# Patient Record
Sex: FEMALE | Race: WHITE | NOT HISPANIC OR LATINO
[De-identification: names, ages, dates, MRNs, and addresses within clinical notes are randomized per-mention and may not be internally consistent; named-entity substitution may affect disease eponyms.]

---

## 2020-11-25 PROBLEM — Z00.00 ENCOUNTER FOR PREVENTIVE HEALTH EXAMINATION: Status: ACTIVE | Noted: 2020-11-25

## 2021-01-28 ENCOUNTER — APPOINTMENT (OUTPATIENT)
Dept: ELECTROPHYSIOLOGY | Facility: CLINIC | Age: 76
End: 2021-01-28

## 2023-10-09 ENCOUNTER — INPATIENT (INPATIENT)
Facility: HOSPITAL | Age: 78
LOS: 2 days | Discharge: NURSING, REHAB OR LONG-TERM CARE | DRG: 206 | End: 2023-10-12
Attending: HOSPITALIST | Admitting: HOSPITALIST
Payer: MEDICARE

## 2023-10-09 VITALS
OXYGEN SATURATION: 96 % | WEIGHT: 199.96 LBS | TEMPERATURE: 98 F | RESPIRATION RATE: 18 BRPM | SYSTOLIC BLOOD PRESSURE: 127 MMHG | HEART RATE: 70 BPM | DIASTOLIC BLOOD PRESSURE: 75 MMHG | HEIGHT: 66 IN

## 2023-10-09 PROCEDURE — 99285 EMERGENCY DEPT VISIT HI MDM: CPT | Mod: GC

## 2023-10-10 DIAGNOSIS — J18.9 PNEUMONIA, UNSPECIFIED ORGANISM: ICD-10-CM

## 2023-10-10 LAB
ALBUMIN SERPL ELPH-MCNC: 4 G/DL — SIGNIFICANT CHANGE UP (ref 3.3–5)
ALP SERPL-CCNC: 89 U/L — SIGNIFICANT CHANGE UP (ref 40–120)
ALT FLD-CCNC: 26 U/L — SIGNIFICANT CHANGE UP (ref 10–45)
ANION GAP SERPL CALC-SCNC: 20 MMOL/L — HIGH (ref 5–17)
APPEARANCE UR: ABNORMAL
APTT BLD: 26.7 SEC — SIGNIFICANT CHANGE UP (ref 24.5–35.6)
AST SERPL-CCNC: 26 U/L — SIGNIFICANT CHANGE UP (ref 10–40)
BACTERIA # UR AUTO: NEGATIVE — SIGNIFICANT CHANGE UP
BASE EXCESS BLDV CALC-SCNC: 9.4 MMOL/L — HIGH (ref -2–3)
BASOPHILS # BLD AUTO: 0.02 K/UL — SIGNIFICANT CHANGE UP (ref 0–0.2)
BASOPHILS NFR BLD AUTO: 0.2 % — SIGNIFICANT CHANGE UP (ref 0–2)
BILIRUB SERPL-MCNC: 1.3 MG/DL — HIGH (ref 0.2–1.2)
BILIRUB UR-MCNC: NEGATIVE — SIGNIFICANT CHANGE UP
BUN SERPL-MCNC: 30 MG/DL — HIGH (ref 7–23)
CALCIUM SERPL-MCNC: 10 MG/DL — SIGNIFICANT CHANGE UP (ref 8.4–10.5)
CHLORIDE SERPL-SCNC: 90 MMOL/L — LOW (ref 96–108)
CO2 BLDV-SCNC: 37 MMOL/L — HIGH (ref 22–26)
CO2 SERPL-SCNC: 23 MMOL/L — SIGNIFICANT CHANGE UP (ref 22–31)
COLOR SPEC: YELLOW — SIGNIFICANT CHANGE UP
CREAT SERPL-MCNC: 0.76 MG/DL — SIGNIFICANT CHANGE UP (ref 0.5–1.3)
DIFF PNL FLD: ABNORMAL
EGFR: 79 ML/MIN/1.73M2 — SIGNIFICANT CHANGE UP
EOSINOPHIL # BLD AUTO: 0.05 K/UL — SIGNIFICANT CHANGE UP (ref 0–0.5)
EOSINOPHIL NFR BLD AUTO: 0.4 % — SIGNIFICANT CHANGE UP (ref 0–6)
EPI CELLS # UR: 24 /HPF — HIGH
GLUCOSE SERPL-MCNC: 119 MG/DL — HIGH (ref 70–99)
GLUCOSE UR QL: NEGATIVE — SIGNIFICANT CHANGE UP
HCO3 BLDV-SCNC: 35 MMOL/L — HIGH (ref 22–29)
HCT VFR BLD CALC: 43.8 % — SIGNIFICANT CHANGE UP (ref 34.5–45)
HGB BLD-MCNC: 15.1 G/DL — SIGNIFICANT CHANGE UP (ref 11.5–15.5)
HYALINE CASTS # UR AUTO: 11 /LPF — HIGH (ref 0–2)
IMM GRANULOCYTES NFR BLD AUTO: 0.7 % — SIGNIFICANT CHANGE UP (ref 0–0.9)
INR BLD: 1.24 RATIO — HIGH (ref 0.85–1.18)
KETONES UR-MCNC: NEGATIVE — SIGNIFICANT CHANGE UP
LEUKOCYTE ESTERASE UR-ACNC: NEGATIVE — SIGNIFICANT CHANGE UP
LIDOCAIN IGE QN: 88 U/L — HIGH (ref 7–60)
LYMPHOCYTES # BLD AUTO: 1.03 K/UL — SIGNIFICANT CHANGE UP (ref 1–3.3)
LYMPHOCYTES # BLD AUTO: 8 % — LOW (ref 13–44)
MCHC RBC-ENTMCNC: 33.8 PG — SIGNIFICANT CHANGE UP (ref 27–34)
MCHC RBC-ENTMCNC: 34.5 GM/DL — SIGNIFICANT CHANGE UP (ref 32–36)
MCV RBC AUTO: 98 FL — SIGNIFICANT CHANGE UP (ref 80–100)
MONOCYTES # BLD AUTO: 0.86 K/UL — SIGNIFICANT CHANGE UP (ref 0–0.9)
MONOCYTES NFR BLD AUTO: 6.7 % — SIGNIFICANT CHANGE UP (ref 2–14)
NEUTROPHILS # BLD AUTO: 10.85 K/UL — HIGH (ref 1.8–7.4)
NEUTROPHILS NFR BLD AUTO: 84 % — HIGH (ref 43–77)
NITRITE UR-MCNC: NEGATIVE — SIGNIFICANT CHANGE UP
NRBC # BLD: 0 /100 WBCS — SIGNIFICANT CHANGE UP (ref 0–0)
NT-PROBNP SERPL-SCNC: 414 PG/ML — HIGH (ref 0–300)
PCO2 BLDV: 52 MMHG — HIGH (ref 39–42)
PH BLDV: 7.44 — HIGH (ref 7.32–7.43)
PH UR: 6 — SIGNIFICANT CHANGE UP (ref 5–8)
PLATELET # BLD AUTO: 263 K/UL — SIGNIFICANT CHANGE UP (ref 150–400)
PO2 BLDV: 31 MMHG — SIGNIFICANT CHANGE UP (ref 25–45)
POTASSIUM SERPL-MCNC: 4.3 MMOL/L — SIGNIFICANT CHANGE UP (ref 3.5–5.3)
POTASSIUM SERPL-SCNC: 4.3 MMOL/L — SIGNIFICANT CHANGE UP (ref 3.5–5.3)
PROCALCITONIN SERPL-MCNC: 0.09 NG/ML — SIGNIFICANT CHANGE UP (ref 0.02–0.1)
PROT SERPL-MCNC: 7.4 G/DL — SIGNIFICANT CHANGE UP (ref 6–8.3)
PROT UR-MCNC: ABNORMAL
PROTHROM AB SERPL-ACNC: 13.6 SEC — HIGH (ref 9.5–13)
RAPID RVP RESULT: SIGNIFICANT CHANGE UP
RBC # BLD: 4.47 M/UL — SIGNIFICANT CHANGE UP (ref 3.8–5.2)
RBC # FLD: 12.2 % — SIGNIFICANT CHANGE UP (ref 10.3–14.5)
RBC CASTS # UR COMP ASSIST: 6 /HPF — HIGH (ref 0–4)
SAO2 % BLDV: 51.1 % — LOW (ref 67–88)
SARS-COV-2 RNA SPEC QL NAA+PROBE: SIGNIFICANT CHANGE UP
SODIUM SERPL-SCNC: 133 MMOL/L — LOW (ref 135–145)
SP GR SPEC: 1.01 — SIGNIFICANT CHANGE UP (ref 1.01–1.02)
TROPONIN T, HIGH SENSITIVITY RESULT: 17 NG/L — SIGNIFICANT CHANGE UP (ref 0–51)
UROBILINOGEN FLD QL: NEGATIVE — SIGNIFICANT CHANGE UP
WBC # BLD: 12.9 K/UL — HIGH (ref 3.8–10.5)
WBC # FLD AUTO: 12.9 K/UL — HIGH (ref 3.8–10.5)
WBC UR QL: 12 /HPF — HIGH (ref 0–5)

## 2023-10-10 PROCEDURE — 72170 X-RAY EXAM OF PELVIS: CPT | Mod: 26

## 2023-10-10 PROCEDURE — 71260 CT THORAX DX C+: CPT | Mod: 26,MA

## 2023-10-10 PROCEDURE — 71045 X-RAY EXAM CHEST 1 VIEW: CPT | Mod: 26

## 2023-10-10 PROCEDURE — 74177 CT ABD & PELVIS W/CONTRAST: CPT | Mod: 26,MA

## 2023-10-10 PROCEDURE — 70450 CT HEAD/BRAIN W/O DYE: CPT | Mod: 26,MA

## 2023-10-10 PROCEDURE — 72125 CT NECK SPINE W/O DYE: CPT | Mod: 26,MA

## 2023-10-10 RX ORDER — HYDROCORTISONE 1 %
1 OINTMENT (GRAM) TOPICAL
Refills: 0 | DISCHARGE

## 2023-10-10 RX ORDER — LOPERAMIDE HCL 2 MG
2 TABLET ORAL
Refills: 0 | Status: DISCONTINUED | OUTPATIENT
Start: 2023-10-10 | End: 2023-10-12

## 2023-10-10 RX ORDER — CHOLESTYRAMINE 4 G/9G
4 POWDER, FOR SUSPENSION ORAL
Refills: 0 | DISCHARGE

## 2023-10-10 RX ORDER — POTASSIUM CHLORIDE 20 MEQ
20 PACKET (EA) ORAL DAILY
Refills: 0 | Status: DISCONTINUED | OUTPATIENT
Start: 2023-10-10 | End: 2023-10-12

## 2023-10-10 RX ORDER — LOPERAMIDE HCL 2 MG
1 TABLET ORAL
Refills: 0 | DISCHARGE

## 2023-10-10 RX ORDER — PREGABALIN 225 MG/1
1 CAPSULE ORAL
Refills: 0 | DISCHARGE

## 2023-10-10 RX ORDER — MULTIVIT-MIN/FERROUS GLUCONATE 9 MG/15 ML
1 LIQUID (ML) ORAL
Refills: 0 | DISCHARGE

## 2023-10-10 RX ORDER — LANOLIN ALCOHOL/MO/W.PET/CERES
1 CREAM (GRAM) TOPICAL
Refills: 0 | DISCHARGE

## 2023-10-10 RX ORDER — POTASSIUM CHLORIDE 20 MEQ
1 PACKET (EA) ORAL
Refills: 0 | DISCHARGE

## 2023-10-10 RX ORDER — LISINOPRIL 2.5 MG/1
1 TABLET ORAL
Refills: 0 | DISCHARGE

## 2023-10-10 RX ORDER — CLONAZEPAM 1 MG
0.5 TABLET ORAL THREE TIMES A DAY
Refills: 0 | Status: DISCONTINUED | OUTPATIENT
Start: 2023-10-10 | End: 2023-10-12

## 2023-10-10 RX ORDER — IPRATROPIUM/ALBUTEROL SULFATE 18-103MCG
3 AEROSOL WITH ADAPTER (GRAM) INHALATION
Refills: 0 | Status: COMPLETED | OUTPATIENT
Start: 2023-10-10 | End: 2023-10-10

## 2023-10-10 RX ORDER — ACETAMINOPHEN 500 MG
2 TABLET ORAL
Refills: 0 | DISCHARGE

## 2023-10-10 RX ORDER — SODIUM CHLORIDE 9 MG/ML
1000 INJECTION INTRAMUSCULAR; INTRAVENOUS; SUBCUTANEOUS ONCE
Refills: 0 | Status: COMPLETED | OUTPATIENT
Start: 2023-10-10 | End: 2023-10-10

## 2023-10-10 RX ORDER — DABIGATRAN ETEXILATE MESYLATE 150 MG/1
150 CAPSULE ORAL EVERY 12 HOURS
Refills: 0 | Status: DISCONTINUED | OUTPATIENT
Start: 2023-10-10 | End: 2023-10-12

## 2023-10-10 RX ORDER — LISINOPRIL 2.5 MG/1
5 TABLET ORAL DAILY
Refills: 0 | Status: DISCONTINUED | OUTPATIENT
Start: 2023-10-10 | End: 2023-10-12

## 2023-10-10 RX ORDER — RISPERIDONE 4 MG/1
1 TABLET ORAL
Refills: 0 | DISCHARGE

## 2023-10-10 RX ORDER — RISPERIDONE 4 MG/1
5 TABLET ORAL
Refills: 0 | DISCHARGE

## 2023-10-10 RX ORDER — DABIGATRAN ETEXILATE MESYLATE 150 MG/1
1 CAPSULE ORAL
Refills: 0 | DISCHARGE

## 2023-10-10 RX ORDER — LIDOCAINE 4 G/100G
1 CREAM TOPICAL DAILY
Refills: 0 | Status: DISCONTINUED | OUTPATIENT
Start: 2023-10-10 | End: 2023-10-12

## 2023-10-10 RX ORDER — ZINC OXIDE 200 MG/G
0 OINTMENT TOPICAL
Refills: 0 | DISCHARGE

## 2023-10-10 RX ORDER — RISPERIDONE 4 MG/1
1.25 TABLET ORAL AT BEDTIME
Refills: 0 | Status: DISCONTINUED | OUTPATIENT
Start: 2023-10-10 | End: 2023-10-12

## 2023-10-10 RX ORDER — CLONAZEPAM 1 MG
1 TABLET ORAL
Refills: 0 | DISCHARGE

## 2023-10-10 RX ORDER — LANOLIN ALCOHOL/MO/W.PET/CERES
3 CREAM (GRAM) TOPICAL AT BEDTIME
Refills: 0 | Status: DISCONTINUED | OUTPATIENT
Start: 2023-10-10 | End: 2023-10-12

## 2023-10-10 RX ORDER — ACETAMINOPHEN 500 MG
1000 TABLET ORAL ONCE
Refills: 0 | Status: COMPLETED | OUTPATIENT
Start: 2023-10-10 | End: 2023-10-10

## 2023-10-10 RX ORDER — TRAMADOL HYDROCHLORIDE 50 MG/1
50 TABLET ORAL EVERY 6 HOURS
Refills: 0 | Status: DISCONTINUED | OUTPATIENT
Start: 2023-10-10 | End: 2023-10-12

## 2023-10-10 RX ADMIN — Medication 3 MILLILITER(S): at 02:45

## 2023-10-10 RX ADMIN — TRAMADOL HYDROCHLORIDE 50 MILLIGRAM(S): 50 TABLET ORAL at 20:37

## 2023-10-10 RX ADMIN — SODIUM CHLORIDE 1000 MILLILITER(S): 9 INJECTION INTRAMUSCULAR; INTRAVENOUS; SUBCUTANEOUS at 02:49

## 2023-10-10 RX ADMIN — Medication 500 MILLIGRAM(S): at 20:36

## 2023-10-10 RX ADMIN — TRAMADOL HYDROCHLORIDE 50 MILLIGRAM(S): 50 TABLET ORAL at 21:30

## 2023-10-10 RX ADMIN — Medication 500 MILLIGRAM(S): at 21:30

## 2023-10-10 RX ADMIN — Medication 3 MILLILITER(S): at 02:27

## 2023-10-10 RX ADMIN — LIDOCAINE 1 PATCH: 4 CREAM TOPICAL at 13:44

## 2023-10-10 RX ADMIN — DABIGATRAN ETEXILATE MESYLATE 150 MILLIGRAM(S): 150 CAPSULE ORAL at 20:36

## 2023-10-10 RX ADMIN — Medication 3 MILLILITER(S): at 02:44

## 2023-10-10 RX ADMIN — Medication 20 MILLIEQUIVALENT(S): at 13:45

## 2023-10-10 RX ADMIN — Medication 0.5 MILLIGRAM(S): at 13:45

## 2023-10-10 RX ADMIN — Medication 400 MILLIGRAM(S): at 02:49

## 2023-10-10 NOTE — ED PROVIDER NOTE - OBJECTIVE STATEMENT
HPI & ROS: 78-year-old female  on Pradaxa, CHF, hypertension, UTIs, coming in with fall.  Has repeated falls history of falls.  Coming in found on the ground unknown downtime unknown length of time.  Patient complaining of left-sided rib pain per her.  And weakness and lethargy.  There was no chest pain when she fell out of bed.  She states that she rolled out of bed.  Hit the left side of her chest, unsure if she hit her head.  Patient has had a productive cough for the past week.

## 2023-10-10 NOTE — H&P ADULT - NSHPPHYSICALEXAM_GEN_ALL_CORE
PHYSICAL EXAM    Constitutional: NAD, well-groomed, well-developed  HEENT: PERRLA, EOMI, Normal Hearing, MMM  Neck: No LAD, No JVD  Back: Normal spine flexure, No CVA tenderness  Respiratory: CTAB/L, left chest wall tenderness , under left breast.    Cardiovascular: S1 and S2, RRR, no M/G/R  Gastrointestinal: BS+, soft, NT/ND  Extremities: No peripheral edema  Vascular: 2+ peripheral pulses  Neurological: A/O x 3, no focal deficits  Skin: No rashes

## 2023-10-10 NOTE — ED PROVIDER NOTE - CLINICAL SUMMARY MEDICAL DECISION MAKING FREE TEXT BOX
MDM/Summary/DDx (includes but is not limited to):  78-year-old female with Pradaxa CHF hypertension UTIs coming with a fall, history of falls, found to have not shock state little vital signs.  Patient having wet cough for the past week, concern for pneumonia versus UTI.  concern for fracture at this time as well, will pan scan the patient.   Will investigate further and admit  Labs:  see the EMR  Imaging:  see the EMR  Tx: Supportive, pain/nausea medications as pt requires/requests.  Consults/Resources: none at this time   Dispo:  admit likely     Triage note reviewed. VS reviewed. EKG reviewed and documented in "RESULTS" section, if possible at given time.     DDx in MDM includes the most likely ddx, but is not limited to solely what is listed. Clinical course may alter/deviate from the above plan. When possible, progress notes written, as needed, and are included in "PROGRESS NOTE" section below.       Medical, family, and social determinants of health reviewed and discussed w/ pt/family/caretaker, when allowable, and is incorporated into note above, whenever possible. MDM/Summary/DDx (includes but is not limited to):  78-year-old female with Pradaxa CHF hypertension UTIs coming with a fall, history of falls, found to have not shock state given vital signs.  Patient having wet cough for the past week, concern for pneumonia versus UTI vs trauma.  concern for fracture at this time as well, will pan scan the patient.   Will investigate further and admit  Labs:  see the EMR  Imaging:  see the EMR  Tx: Supportive, pain/nausea medications as pt requires/requests.  Consults/Resources: none at this time   Dispo:  admit likely     Triage note reviewed. VS reviewed. EKG reviewed and documented in "RESULTS" section, if possible at given time.     DDx in MDM includes the most likely ddx, but is not limited to solely what is listed. Clinical course may alter/deviate from the above plan. When possible, progress notes written, as needed, and are included in "PROGRESS NOTE" section below.       Medical, family, and social determinants of health reviewed and discussed w/ pt/family/caretaker, when allowable, and is incorporated into note above, whenever possible.

## 2023-10-10 NOTE — H&P ADULT - NSHPREVIEWOFSYSTEMS_GEN_ALL_CORE
REVIEW OF SYSTEMS:  CONSTITUTIONAL: No fever, weight loss, or fatigue  EYES: No eye pain, visual disturbances, or discharge  ENMT:  No difficulty hearing, tinnitus, vertigo; No sinus or throat pain  NECK: No pain or stiffness  BREASTS: No pain, masses, or nipple discharge  RESPIRATORY: No cough, wheezing, chills or hemoptysis; No shortness of breath  CARDIOVASCULAR: No chest pain, palpitations, dizziness, or leg swelling, left chest wall pain   GASTROINTESTINAL: No abdominal or epigastric pain. No nausea, vomiting, or hematemesis; No diarrhea or constipation. No melena or hematochezia.  GENITOURINARY: No dysuria, frequency, hematuria, or incontinence  NEUROLOGICAL: No headaches, memory loss, loss of strength, numbness, or tremors  SKIN: No itching, burning, rashes, or lesions   LYMPH NODES: No enlarged glands  ENDOCRINE: No heat or cold intolerance; No hair loss; No polydipsia or polyuria  MUSCULOSKELETAL: No joint pain or swelling; No muscle, back, or extremity pain  PSYCHIATRIC: No depression, anxiety, mood swings, or difficulty sleeping  HEME/LYMPH: No easy bruising, or bleeding gums  ALLERGY AND IMMUNOLOGIC: No hives or eczema

## 2023-10-10 NOTE — ED PROVIDER NOTE - PHYSICAL EXAMINATION
Exam as stated below:   CONSTITUTIONAL:   Intermittently in pain grabbing at her left side where her 11 12th rib is located.   SKIN: Warm dry.  some erythema noted at the left side below the 10th-11th rib  HEAD: NCAT.   EYES: No scleral icterus. Conjunctiva pink.  RESP:   Patient wheezing, not stridorous.  No focal findings of diminished lung sounds.  ABD:  patient abdomen soft, questionable tenderness to palpation at the interface between the 11th and 12th rib on the left side in the abdomen.  No real abdominal pain.  Suprapubic pain elicited.  NEURO: UE/LE grossly intact. Motor UE/LE sensation grossly intact. CN II-XII grossly intact.   PSYCH: Cooperative, appropriate.

## 2023-10-10 NOTE — PHYSICAL THERAPY INITIAL EVALUATION ADULT - ADDITIONAL COMMENTS
as per care coordinator,. pt was at [facility]. ambulatory with RW. pt also stays in . able to dress upper body. Min A for ADL's.

## 2023-10-10 NOTE — ED PROVIDER NOTE - NSICDXPASTMEDICALHX_GEN_ALL_CORE_FT
PAST MEDICAL HISTORY:  Atrial fibrillation     CHF (congestive heart failure)     Cholelithiasis     HTN (hypertension)     Osteoarthritis

## 2023-10-10 NOTE — CONSULT NOTE ADULT - ASSESSMENT
77 yo F with PMH of chronic Afib, respiratory failure, MDD, essential tremor, HTN, CHF, s/p AICD, GERD with recent admission in [facility] for respiratory failure, who was transferred to [facility] ER due to left chest wall pain and was found with left 8 rib Fx with mild displacement.       Left sided rib fracture   A fibrillation:   MDD  BET  HTN  CHF  GERD       Left sided rib fracture   -ct chest : Mildly displaced left lateral eighth rib fracture. No pneumothorax. Trace right pleural effusion. No evidence for acute abdominal visceral injury. Age indeterminant superior endplate compression deformity of T12. Her ventilation is K: ABG noted:  pain control : NO PTX AND NO SIGN PPL EFFUSION  A fibrillation: in dabigtran   MDD : per PMD  BET : cont home meds  HTN : controlled  CHF : no overt signs of heart failure  GERD  : PPI:    jody burton

## 2023-10-10 NOTE — CONSULT NOTE ADULT - SUBJECTIVE AND OBJECTIVE BOX
10-10-23 @ 14:02    Patient is a 78y old  Female who presents with a chief complaint of Chest wall pain (10 Oct 2023 08:00)      HPI:  .77 yo F with PMH of chronic Afib, respiratory failure, MDD, essential tremor, HTN, CHF, s/p AICD, GERD with recent admission in [facility] for respiratory failure, who was transferred to [facility] ER due to left chest wall pain and was found with left 8 rib Fx with mild displacement.    (10 Oct 2023 08:00):    pt say she has left sided chest pain especially when she moves:   on 2 L of oxygen      ?FOLLOWING PRESENT  [x ] Hx of PE/DVT, [x ] Hx COPD, [x ] Hx of Asthma, [y ] Hx of Hospitalization, [x ]  Hx of BiPAP/CPAP use, [ x] Hx of GRACIELA    Allergies    No Known Allergies    Intolerances        PAST MEDICAL & SURGICAL HISTORY:  HTN (hypertension)      Atrial fibrillation      CHF (congestive heart failure)      Osteoarthritis      Cholelithiasis      S/P cholecystectomy      S/P hysterectomy      S/P knee replacement, bilateral      Presence of cardioverter defibrillator          FAMILY HISTORY:      Social History: [ unknown ] TOBACCO                  [ x ] ETOH                                 [ x ] IVDA/DRUGS    REVIEW OF SYSTEMS      General:	x    Skin/Breast:x  	  Ophthalmologic:x  	  ENMT:	x    Respiratory and Thorax: s/p fall : chest pain   	  Cardiovascular:	x    Gastrointestinal:	x    Genitourinary:	x    Musculoskeletal:	x    Neurological:	x    Psychiatric:	x    Hematology/Lymphatics:	x    Endocrine:	x    Allergic/Immunologic:	x    MEDICATIONS  (STANDING):  clonazePAM  Tablet 0.5 milliGRAM(s) Oral three times a day  dabigatran 150 milliGRAM(s) Oral every 12 hours  lidocaine   4% Patch 1 Patch Transdermal daily  lisinopril 5 milliGRAM(s) Oral daily  melatonin 3 milliGRAM(s) Oral at bedtime  naproxen 500 milliGRAM(s) Oral two times a day  potassium chloride    Tablet ER 20 milliEquivalent(s) Oral daily  risperiDONE   Tablet 1.25 milliGRAM(s) Oral at bedtime    MEDICATIONS  (PRN):  loperamide 2 milliGRAM(s) Oral four times a day PRN Diarrhea  traMADol 50 milliGRAM(s) Oral every 6 hours PRN Severe Pain (7 - 10)       Vital Signs Last 24 Hrs  T(C): 36.8 (10 Oct 2023 12:23), Max: 37.7 (10 Oct 2023 00:56)  T(F): 98.2 (10 Oct 2023 12:23), Max: 99.8 (10 Oct 2023 00:56)  HR: 71 (10 Oct 2023 12:) (70 - 79)  BP: 134/58 (10 Oct 2023 12:23) (111/74 - 134/58)  BP(mean): 81 (10 Oct 2023 00:56) (81 - 81)  RR: 20 (10 Oct 2023 12:) (18 - 20)  SpO2: 98% (10 Oct 2023 12:) (96% - 98%)    Parameters below as of 10 Oct 2023 12:23  Patient On (Oxygen Delivery Method): nasal cannula  O2 Flow (L/min): 2  Orthostatic VS          I&O's Summary      Physical Exam:   GENERAL: Obese  HEENT: RODERICK/   Atraumatic, Normocephalic  ENMT: No tonsillar erythema, exudates, or enlargement; Moist mucous membranes, Good dentition, No lesions  NECK: Supple, No JVD, Normal thyroid  CHEST/LUNG: left chest wall tenderness  CVS: Regular rate and rhythm; No murmurs, rubs, or gallops  GI: : Soft, Nontender, Nondistended; Bowel sounds present  NERVOUS SYSTEM:  Alert & Oriented X3  EXTREMITIES:  -edema  LYMPH: No lymphadenopathy noted  SKIN: No rashes or lesions  ENDOCRINOLOGY: No Thyromegaly  PSYCH: Appropriate    Labs:  9.4<52<4>>31<<7.445>>9.4<<3><<4><<5<<319>>SARS-CoV-2: NotDetec (10 Oct 2023 02:24)    CARDIAC MARKERS ( 10 Oct 2023 02:30 )  x     / x     / 71 U/L / x     / x                                15.1   12.90 )-----------( 263      ( 10 Oct 2023 02:30 )             43.8     10-10    133<L>  |  90<L>  |  30<H>  ----------------------------<  119<H>  4.3   |  23  |  0.76    Ca    10.0      10 Oct 2023 02:30    TPro  7.4  /  Alb  4.0  /  TBili  1.3<H>  /  DBili  x   /  AST  26  /  ALT  26  /  AlkPhos  89  10-10    CAPILLARY BLOOD GLUCOSE        LIVER FUNCTIONS - ( 10 Oct 2023 02:30 )  Alb: 4.0 g/dL / Pro: 7.4 g/dL / ALK PHOS: 89 U/L / ALT: 26 U/L / AST: 26 U/L / GGT: x           PT/INR - ( 10 Oct 2023 02:30 )   PT: 13.6 sec;   INR: 1.24 ratio         PTT - ( 10 Oct 2023 02:30 )  PTT:26.7 sec  Urinalysis Basic - ( 10 Oct 2023 03:58 )    Color: Yellow / Appearance: Slightly Turbid / S.014 / pH: x  Gluc: x / Ketone: Negative  / Bili: Negative / Urobili: Negative   Blood: x / Protein: Trace / Nitrite: Negative   Leuk Esterase: Negative / RBC: 6 /hpf / WBC 12 /HPF   Sq Epi: x / Non Sq Epi: x / Bacteria: Negative      D DImer  Procalcitonin, Serum: 0.09 ng/mL (10-10 @ 02:30)      Studies  Chest X-RAY  CT SCAN Chest   CT Abdomen  Venous Dopplers: LE:   Others          rad< from: CT Chest w/ IV Cont (10.10.23 @ 04:35) >  T12, without discrete fracture line or paraspinal infiltration.    IMPRESSION:  Mildly displaced left lateral eighth rib fracture. No pneumothorax.  Trace right pleural effusion.  No evidence for acute abdominal visceral injury.  Age indeterminant superior endplate compression deformity of T12.        --- End of Report ---            JESUS MEDLEY MD; Attending Radiologist  This document has been electronically signed. Oct 10 2023  5:15AM    < end of copied text >  < from: CT Chest w/ IV Cont (10.10.23 @ 04:35) >  T12, without discrete fracture line or paraspinal infiltration.    IMPRESSION:  Mildly displaced left lateral eighth rib fracture. No pneumothorax.  Trace right pleural effusion.  No evidence for acute abdominal visceral injury.  Age indeterminant superior endplate compression deformity of T12.        --- End of Report ---            JESUS MEDLEY MD; Attending Radiologist  This document has been electronically signed. Oct 10 2023  5:15AM    < end of copied text >  < from: CT Abdomen and Pelvis w/ IV Cont (10.10.23 @ 04:35) >  T12, without discrete fracture line or paraspinal infiltration.    IMPRESSION:  Mildly displaced left lateral eighth rib fracture. No pneumothorax.  Trace right pleural effusion.  No evidence for acute abdominal visceral injury.  Age indeterminant superior endplate compression deformity of T12.        --- End of Report ---      < end of copied text >

## 2023-10-10 NOTE — ED ADULT NURSE NOTE - OBJECTIVE STATEMENT
Patient is a 78 year old female brought in by EMS after a fall. Patient is a resident at [facility] where EMS reports finding her on the floor after falling out of bed - unknown down time, unknown LOC, unknown head strike, positive AC use - complaining now of left sided rib and hip pain. On assessment A&Ox3, breathing comfortably on 2L NC, no accessory muscle use, moving all extremities well, paced on the cardiac monitor, no JVD, no edema noted, strong bilateral peripheral pulses, abdomen soft nontender, skin warm and normal for race. Patient denies headache, dizziness, chest pain, palpitations, cough, SOB, abdominal pain, n/v/d, urinary symptoms at this time. PMH HTN, a-fib, anemia.

## 2023-10-10 NOTE — ED ADULT NURSE NOTE - NSFALLHARMRISKINTERV_ED_ALL_ED

## 2023-10-10 NOTE — ED PROVIDER NOTE - ATTENDING CONTRIBUTION TO CARE
I have personally performed a face to face medical and diagnostic evaluation of the patient. I have discussed with and reviewed the Resident's note and agree with the History, ROS, Physical Exam and MDM unless otherwise indicated. A brief summary of my personal evaluation and impression can be found below.    78-year-old female with Pradaxa CHF hypertension UTIs coming with a fall, history of falls, found to have not shock state given vital signs.  Patient having wet cough for the past week, concern for pneumonia versus UTI vs trauma.  concern for fracture at this time as well, will pan scan the patient. On exam, VSS w coarse breath sounds diffusely. Reproducible chest wall tenderness. Plan for labs, CT angio chest. Will reassess to dispo. Will likely need admission. Kelly Yu, ED Attending

## 2023-10-10 NOTE — ED PROVIDER NOTE - NSICDXPASTSURGICALHX_GEN_ALL_CORE_FT
PAST SURGICAL HISTORY:  Presence of cardioverter defibrillator     S/P cholecystectomy     S/P hysterectomy     S/P knee replacement, bilateral

## 2023-10-10 NOTE — PHYSICAL THERAPY INITIAL EVALUATION ADULT - PERTINENT HX OF CURRENT PROBLEM, REHAB EVAL
79 yo F with PMH of chronic Afib, respiratory failure, MDD, essential tremor, HTN, CHF, s/p AICD, GERD with recent admission in [facility] for respiratory failure, who was transferred to [facility] ER due to left chest wall pain and was found with left 8 rib Fx with mild displacement.      DX: Pneumonia. chest wall pain s/p lidocaine patch, acetaminophen hyponatremia- mild, asymptomatic on Torsemide.

## 2023-10-10 NOTE — H&P ADULT - HISTORY OF PRESENT ILLNESS
.77 yo F with PMH of chronic Afib, respiratory failure, MDD, essential tremor, HTN, CHF, s/p AICD, GERD with recent admission in [facility] for respiratory failure, who was transferred to [facility] ER due to left chest wall pain and was found with left 8 rib Fx with mild displacement.

## 2023-10-11 ENCOUNTER — TRANSCRIPTION ENCOUNTER (OUTPATIENT)
Age: 78
End: 2023-10-11

## 2023-10-11 LAB
ANION GAP SERPL CALC-SCNC: 14 MMOL/L — SIGNIFICANT CHANGE UP (ref 5–17)
BUN SERPL-MCNC: 24 MG/DL — HIGH (ref 7–23)
C DIFF GDH STL QL: NEGATIVE — SIGNIFICANT CHANGE UP
C DIFF GDH STL QL: SIGNIFICANT CHANGE UP
CALCIUM SERPL-MCNC: 9.5 MG/DL — SIGNIFICANT CHANGE UP (ref 8.4–10.5)
CHLORIDE SERPL-SCNC: 95 MMOL/L — LOW (ref 96–108)
CO2 SERPL-SCNC: 25 MMOL/L — SIGNIFICANT CHANGE UP (ref 22–31)
CREAT SERPL-MCNC: 0.66 MG/DL — SIGNIFICANT CHANGE UP (ref 0.5–1.3)
CULTURE RESULTS: SIGNIFICANT CHANGE UP
EGFR: 89 ML/MIN/1.73M2 — SIGNIFICANT CHANGE UP
GLUCOSE SERPL-MCNC: 95 MG/DL — SIGNIFICANT CHANGE UP (ref 70–99)
POTASSIUM SERPL-MCNC: 3.3 MMOL/L — LOW (ref 3.5–5.3)
POTASSIUM SERPL-SCNC: 3.3 MMOL/L — LOW (ref 3.5–5.3)
SODIUM SERPL-SCNC: 134 MMOL/L — LOW (ref 135–145)
SPECIMEN SOURCE: SIGNIFICANT CHANGE UP

## 2023-10-11 RX ADMIN — Medication 0.5 MILLIGRAM(S): at 22:07

## 2023-10-11 RX ADMIN — DABIGATRAN ETEXILATE MESYLATE 150 MILLIGRAM(S): 150 CAPSULE ORAL at 12:41

## 2023-10-11 RX ADMIN — Medication 500 MILLIGRAM(S): at 20:11

## 2023-10-11 RX ADMIN — LISINOPRIL 5 MILLIGRAM(S): 2.5 TABLET ORAL at 05:54

## 2023-10-11 RX ADMIN — TRAMADOL HYDROCHLORIDE 50 MILLIGRAM(S): 50 TABLET ORAL at 12:41

## 2023-10-11 RX ADMIN — DABIGATRAN ETEXILATE MESYLATE 150 MILLIGRAM(S): 150 CAPSULE ORAL at 20:11

## 2023-10-11 RX ADMIN — Medication 100 MILLIGRAM(S): at 18:50

## 2023-10-11 RX ADMIN — Medication 500 MILLIGRAM(S): at 12:40

## 2023-10-11 RX ADMIN — LIDOCAINE 1 PATCH: 4 CREAM TOPICAL at 19:05

## 2023-10-11 RX ADMIN — LIDOCAINE 1 PATCH: 4 CREAM TOPICAL at 18:51

## 2023-10-11 RX ADMIN — Medication 500 MILLIGRAM(S): at 21:07

## 2023-10-11 RX ADMIN — RISPERIDONE 1.25 MILLIGRAM(S): 4 TABLET ORAL at 22:07

## 2023-10-11 RX ADMIN — Medication 20 MILLIEQUIVALENT(S): at 12:41

## 2023-10-11 RX ADMIN — Medication 0.5 MILLIGRAM(S): at 05:54

## 2023-10-11 RX ADMIN — Medication 3 MILLIGRAM(S): at 22:06

## 2023-10-11 NOTE — DISCHARGE NOTE PROVIDER - CARE PROVIDER_API CALL
Nickolas Jaffe  Internal Medicine  [id number] [place] [id number]  Phone: [phone]  Fax: [phone]  Follow Up Time: 1 week

## 2023-10-11 NOTE — DISCHARGE NOTE PROVIDER - NSDCMRMEDTOKEN_GEN_ALL_CORE_FT
acetaminophen 325 mg oral tablet: 2 tab(s) orally every 6 hours as needed for  pain  Artificial Tears ophthalmic solution: 1 drop(s) in each eye once a day  cholestyramine 4 g/8.3 g oral powder for reconstitution: 4 gram(s) orally once a day before or after breakfast  clonazePAM 0.5 mg oral tablet: 1 tab(s) orally 3 times a day  hydrocortisone 1% topical cream: Apply topically to affected area , upper back 2 times a day x 10 days  Klor-Con M20 oral tablet, extended release: 1 tab(s) orally once a day  lisinopril 5 mg oral tablet: 1 tab(s) orally once a day  loperamide 2 mg oral tablet: 1 tab(s) orally 4 times a day as needed for  diarrhea  melatonin 3 mg oral tablet: 1 tab(s) orally once a day (at bedtime) as needed for  insomnia  Multiple Vitamins with Minerals oral tablet: 1 tab(s) orally once a day  Pradaxa 150 mg oral capsule: 1 cap(s) orally 2 times a day  predniSONE 20 mg oral tablet: 1 tab(s) orally once a day for 3 days  Preparation H (phenylephrine, witch hazel) 0.25%-50% topical gel: apply topical gel rectally once daily as needed  RisperDAL 0.25 mg oral tablet: 1 tab(s) orally once a day at 1pm.  RisperDAL 0.25 mg oral tablet: 5 tab(s) orally once a day (at bedtime) NOTE: total of 1.25mg at bedtime, pt takes 1mg tab and 0.25mg tab  RisperDAL 0.25 mg oral tablet: 5 tab(s) orally once a day (at bedtime) NOTE: total of 1.25mg at bedtime, pt takes 1mg + .25mg tab  Robitussin Mucus-Chest Congestion 100mg/5mL oral liquid: 10ml (200mg) every 4 hours as needed  torsemide 20 mg oral tablet: 3 tab(s) orally 2 times a day  Vitamin B-12 Time Release 1000 mcg oral tablet, extended release: 1 tab(s) orally once a day  zinc oxide topical ointment: Apply topically to affected area 2 times a day for 14 days

## 2023-10-11 NOTE — DISCHARGE NOTE PROVIDER - NSDCFUADDAPPT_GEN_ALL_CORE_FT
APPTS ARE READY TO BE MADE: [ x] YES    Best Family or Patient Contact (if needed):    Additional Information about above appointments (if needed):    1:   2:   3:     Other comments or requests:    APPTS ARE READY TO BE MADE: [ x] YES    Best Family or Patient Contact (if needed):    Additional Information about above appointments (if needed):    1:   2:   3:     Other comments or requests:       Patient is being discharged to SNF. Patient/caregiver will arrange follow up appointments.  APPTS ARE READY TO BE MADE: [ x] YES    Best Family or Patient Contact (if needed):    Additional Information about above appointments (if needed):    1: pcp  2:   3:     Other comments or requests:       Patient is being discharged to SNF. Patient/caregiver will arrange follow up appointments.

## 2023-10-11 NOTE — DISCHARGE NOTE PROVIDER - NSDCCPCAREPLAN_GEN_ALL_CORE_FT
PRINCIPAL DISCHARGE DIAGNOSIS  Diagnosis: Fracture, rib  Assessment and Plan of Treatment: You was noted to have a 8th rib fracture. All activity as tolerated. Please splint area if coughing/laughing. If you develop any increased shortness of breath, pain, fevers, chills please call your PCP ASAP or report to the ED. Take your pain medications as needed. Follow-up with your PCP within 1 week of discharge      SECONDARY DISCHARGE DIAGNOSES  Diagnosis: Afib  Assessment and Plan of Treatment: You have history of Afib. Please continue to take your Pradaxa as prescribed. If you develop any bleeding gums, bloody stools or blood in urine please let your PCP know right away.    Diagnosis: HTN (hypertension)  Assessment and Plan of Treatment: Low salt diet  Activity as tolerated.  Take all medication as prescribed.  Follow up with your medical doctor for routine blood pressure monitoring at your next visit.  Notify your doctor if you have any of the following symptoms:   Dizziness, Lightheadedness, Blurry vision, Headache, Chest pain, Shortness of breath    Diagnosis: Urinary tract infection  Assessment and Plan of Treatment: You were noted to have a urinary tract infection. You completed 7 days of antibiotics Vantin. Please remain hydrated. If you develop fever, chills, painful urination, foul smelling urine, please let your PCP know right away.    Diagnosis: Bipolar disorder  Assessment and Plan of Treatment: You have a history of Bipolar disorder with paranoia and JANE- your was increased Risperdal to 0.25 at 1PM and 1 mg QHS

## 2023-10-11 NOTE — DISCHARGE NOTE PROVIDER - HOSPITAL COURSE
Patient is a 77 y/o female with PMH of chronic Afib, respiratory failure, MDD, essential tremor, HTN, CHF, s/p AICD, GERD with recent admission in [facility] for respiratory failure, who was transferred to [facility] ER due to left chest wall pain and was found with left 8 rib Fx with mild displacement.       Hospital Course: Patient presented to ED from [facility] for Left Chest wall pain due to 8th rib fracture. CXR done -> No focal consolidations. Mild pulmonary vascular congestion. CT Chest/A/P was preformed-> Mildly displaced left lateral eighth rib fracture. No pneumothorax. Trace right pleural effusion. No evidence for acute abdominal visceral injury. Age indeterminant superior endplate compression deformity of T12. Pulmonary was consulted -> Left sided rib fracture No evidence for acute abdominal visceral injury. No planned intervention. Pain management Tramadol PRN, Lidocaine patch, incentive spirometer. Patient had Echo-> HFrEF 48%- increase Torsemide 60 mg BID. Patient noted to have Left upper extremity edema-> Doppler -ve for DVT. Patient evaluated by Physical therapy-> Subacute rehab       Important Medication Changes and Reason:  Patient started on Tramadol PRN for pain management and Lidocaine patches   History of Bipolar with paranoia and JANE- increased Risperdal to 0.25 at 1PM and 1 mg QHS    Active or Pending Issues Requiring Follow-up:  Rib fracture     Advanced Directives:   [ x] Full code  [ ] DNR  [ ] Hospice    Discharge Diagnoses:  Atrial fibrillation   CHF   Cholelithiasis   HTN   Osteoarthritis.

## 2023-10-12 ENCOUNTER — TRANSCRIPTION ENCOUNTER (OUTPATIENT)
Age: 78
End: 2023-10-12

## 2023-10-12 VITALS
OXYGEN SATURATION: 95 % | DIASTOLIC BLOOD PRESSURE: 71 MMHG | SYSTOLIC BLOOD PRESSURE: 110 MMHG | HEART RATE: 70 BPM | RESPIRATION RATE: 18 BRPM

## 2023-10-12 LAB
ANION GAP SERPL CALC-SCNC: 10 MMOL/L — SIGNIFICANT CHANGE UP (ref 5–17)
BUN SERPL-MCNC: 17 MG/DL — SIGNIFICANT CHANGE UP (ref 7–23)
CALCIUM SERPL-MCNC: 8.9 MG/DL — SIGNIFICANT CHANGE UP (ref 8.4–10.5)
CHLORIDE SERPL-SCNC: 94 MMOL/L — LOW (ref 96–108)
CO2 SERPL-SCNC: 24 MMOL/L — SIGNIFICANT CHANGE UP (ref 22–31)
CREAT SERPL-MCNC: 0.58 MG/DL — SIGNIFICANT CHANGE UP (ref 0.5–1.3)
EGFR: 91 ML/MIN/1.73M2 — SIGNIFICANT CHANGE UP
GLUCOSE SERPL-MCNC: 98 MG/DL — SIGNIFICANT CHANGE UP (ref 70–99)
HCT VFR BLD CALC: 39.6 % — SIGNIFICANT CHANGE UP (ref 34.5–45)
HGB BLD-MCNC: 13.3 G/DL — SIGNIFICANT CHANGE UP (ref 11.5–15.5)
MCHC RBC-ENTMCNC: 33.4 PG — SIGNIFICANT CHANGE UP (ref 27–34)
MCHC RBC-ENTMCNC: 33.6 GM/DL — SIGNIFICANT CHANGE UP (ref 32–36)
MCV RBC AUTO: 99.5 FL — SIGNIFICANT CHANGE UP (ref 80–100)
NRBC # BLD: 0 /100 WBCS — SIGNIFICANT CHANGE UP (ref 0–0)
PLATELET # BLD AUTO: 196 K/UL — SIGNIFICANT CHANGE UP (ref 150–400)
POTASSIUM SERPL-MCNC: 3.2 MMOL/L — LOW (ref 3.5–5.3)
POTASSIUM SERPL-SCNC: 3.2 MMOL/L — LOW (ref 3.5–5.3)
RBC # BLD: 3.98 M/UL — SIGNIFICANT CHANGE UP (ref 3.8–5.2)
RBC # FLD: 12 % — SIGNIFICANT CHANGE UP (ref 10.3–14.5)
SODIUM SERPL-SCNC: 128 MMOL/L — LOW (ref 135–145)
WBC # BLD: 6.95 K/UL — SIGNIFICANT CHANGE UP (ref 3.8–10.5)
WBC # FLD AUTO: 6.95 K/UL — SIGNIFICANT CHANGE UP (ref 3.8–10.5)

## 2023-10-12 RX ADMIN — DABIGATRAN ETEXILATE MESYLATE 150 MILLIGRAM(S): 150 CAPSULE ORAL at 08:09

## 2023-10-12 RX ADMIN — Medication 0.5 MILLIGRAM(S): at 06:09

## 2023-10-12 RX ADMIN — LIDOCAINE 1 PATCH: 4 CREAM TOPICAL at 06:15

## 2023-10-12 RX ADMIN — Medication 500 MILLIGRAM(S): at 08:09

## 2023-10-12 RX ADMIN — Medication 500 MILLIGRAM(S): at 08:30

## 2023-10-12 NOTE — PROGRESS NOTE ADULT - SUBJECTIVE AND OBJECTIVE BOX
Date of Service: 10-11-23 @ 14:57    Patient is a 78y old  Female who presents with a chief complaint of Chest wall pain (11 Oct 2023 12:18)      Any change in ROS: Still with left sided chest pain : on 2 l OF OXYGEN      MEDICATIONS  (STANDING):  clonazePAM  Tablet 0.5 milliGRAM(s) Oral three times a day  dabigatran 150 milliGRAM(s) Oral every 12 hours  lidocaine   4% Patch 1 Patch Transdermal daily  lisinopril 5 milliGRAM(s) Oral daily  melatonin 3 milliGRAM(s) Oral at bedtime  naproxen 500 milliGRAM(s) Oral two times a day  potassium chloride    Tablet ER 20 milliEquivalent(s) Oral daily  risperiDONE   Tablet 1.25 milliGRAM(s) Oral at bedtime    MEDICATIONS  (PRN):  loperamide 2 milliGRAM(s) Oral four times a day PRN Diarrhea  traMADol 50 milliGRAM(s) Oral every 6 hours PRN Severe Pain (7 - 10)    Vital Signs Last 24 Hrs  T(C): 36.4 (11 Oct 2023 05:01), Max: 36.4 (10 Oct 2023 20:45)  T(F): 97.5 (11 Oct 2023 05:01), Max: 97.5 (10 Oct 2023 20:45)  HR: 70 (11 Oct 2023 05:01) (70 - 98)  BP: 102/57 (11 Oct 2023 05:01) (102/57 - 120/67)  BP(mean): --  RR: 18 (11 Oct 2023 05:01) (18 - 20)  SpO2: 94% (11 Oct 2023 05:01) (94% - 97%)    Parameters below as of 11 Oct 2023 05:01  Patient On (Oxygen Delivery Method): nasal cannula  O2 Flow (L/min): 2      I&O's Summary        Physical Exam:   GENERAL: NAD, well-groomed, well-developed  HEENT: RODERICK/   Atraumatic, Normocephalic  ENMT: No tonsillar erythema, exudates, or enlargement; Moist mucous membranes, Good dentition, No lesions  NECK: Supple, No JVD, Normal thyroid  CHEST/LUNG: Clear to ausculation no wheezing  CVS: Regular rate and rhythm; No murmurs, rubs, or gallops  GI: : Soft, Nontender, Nondistended; Bowel sounds present  NERVOUS SYSTEM:  Alert & awake and responsive to simple commands  EXTREMITIES:  2+ Peripheral Pulses, No clubbing, cyanosis, or edema  LYMPH: No lymphadenopathy noted  SKIN: No rashes or lesions  ENDOCRINOLOGY: No Thyromegaly  PSYCH: Appropriate    Labs:  35  CARDIAC MARKERS ( 10 Oct 2023 02:30 )  x     / x     / 71 U/L / x     / x                                15.1   12.90 )-----------( 263      ( 10 Oct 2023 02:30 )             43.8     10-11    134<L>  |  95<L>  |  24<H>  ----------------------------<  95  3.3<L>   |  25  |  0.66  10-10    133<L>  |  90<L>  |  30<H>  ----------------------------<  119<H>  4.3   |  23  |  0.76    Ca    9.5      11 Oct 2023 06:59  Ca    10.0      10 Oct 2023 02:30    TPro  7.4  /  Alb  4.0  /  TBili  1.3<H>  /  DBili  x   /  AST  26  /  ALT  26  /  AlkPhos  89  10-10    CAPILLARY BLOOD GLUCOSE          LIVER FUNCTIONS - ( 10 Oct 2023 02:30 )  Alb: 4.0 g/dL / Pro: 7.4 g/dL / ALK PHOS: 89 U/L / ALT: 26 U/L / AST: 26 U/L / GGT: x           PT/INR - ( 10 Oct 2023 02:30 )   PT: 13.6 sec;   INR: 1.24 ratio         PTT - ( 10 Oct 2023 02:30 )  PTT:26.7 sec  Urinalysis Basic - ( 11 Oct 2023 06:59 )    Color: x / Appearance: x / SG: x / pH: x  Gluc: 95 mg/dL / Ketone: x  / Bili: x / Urobili: x   Blood: x / Protein: x / Nitrite: x   Leuk Esterase: x / RBC: x / WBC x   Sq Epi: x / Non Sq Epi: x / Bacteria: x      Procalcitonin, Serum: 0.09 ng/mL (10-10 @ 02:30)        RECENT CULTURES:  10-10 @ 03:58 Clean Catch Clean Catch (Midstream)       rad< from: CT Abdomen and Pelvis w/ IV Cont (10.10.23 @ 04:35) >  T12, without discrete fracture line or paraspinal infiltration.    IMPRESSION:  Mildly displaced left lateral eighth rib fracture. No pneumothorax.  Trace right pleural effusion.  No evidence for acute abdominal visceral injury.  Age indeterminant superior endplate compression deformity of T12.        --- End of Report ---            JESUS MEDLEY MD; Attending Radiologist  This document has been electronically signed. Oct 10 2023  5:15AM    < end of copied text >           <10,000 CFU/mL Normal Urogenital Sudha          RESPIRATORY CULTURES:          Studies  Chest X-RAY  CT SCAN Chest   Venous Dopplers: LE:   CT Abdomen  Others              
Date of Service: 10-12-23 @ 14:53    Patient is a 78y old  Female who presents with a chief complaint of Chest wall pain (11 Oct 2023 19:07)      Any change in ROS: Pt looks better to me today  : the pain is better controlled:   no resp distress    MEDICATIONS  (STANDING):  clonazePAM  Tablet 0.5 milliGRAM(s) Oral three times a day  dabigatran 150 milliGRAM(s) Oral every 12 hours  lidocaine   4% Patch 1 Patch Transdermal daily  lisinopril 5 milliGRAM(s) Oral daily  melatonin 3 milliGRAM(s) Oral at bedtime  naproxen 500 milliGRAM(s) Oral two times a day  potassium chloride    Tablet ER 20 milliEquivalent(s) Oral daily  risperiDONE   Tablet 1.25 milliGRAM(s) Oral at bedtime    MEDICATIONS  (PRN):  guaiFENesin Oral Liquid (Sugar-Free) 100 milliGRAM(s) Oral every 6 hours PRN Cough  loperamide 2 milliGRAM(s) Oral four times a day PRN Diarrhea  traMADol 50 milliGRAM(s) Oral every 6 hours PRN Severe Pain (7 - 10)    Vital Signs Last 24 Hrs  T(C): 36.5 (12 Oct 2023 04:47), Max: 36.5 (11 Oct 2023 21:59)  T(F): 97.7 (12 Oct 2023 04:47), Max: 97.7 (11 Oct 2023 21:59)  HR: 70 (12 Oct 2023 11:46) (70 - 72)  BP: 110/71 (12 Oct 2023 11:46) (95/61 - 137/66)  BP(mean): --  RR: 18 (12 Oct 2023 11:46) (18 - 18)  SpO2: 95% (12 Oct 2023 11:46) (95% - 96%)    Parameters below as of 12 Oct 2023 11:46  Patient On (Oxygen Delivery Method): nasal cannula  O2 Flow (L/min): 2      I&O's Summary        Physical Exam:   GENERAL: Obese  HEENT: RODERICK/   Atraumatic, Normocephalic  ENMT: No tonsillar erythema, exudates, or enlargement; Moist mucous membranes, Good dentition, No lesions  NECK: Supple, No JVD, Normal thyroid  CHEST/LUNG: Clear to auscultaion  CVS: Regular rate and rhythm; No murmurs, rubs, or gallops  GI: : Soft, Nontender, Nondistended; Bowel sounds present  NERVOUS SYSTEM:  Alert & Oriented X3  EXTREMITIES:  - edema  LYMPH: No lymphadenopathy noted  SKIN: No rashes or lesions  ENDOCRINOLOGY: No Thyromegaly  PSYCH: Appropriate    Labs:  35                            13.3   6.95  )-----------( 196      ( 12 Oct 2023 06:46 )             39.6                         15.1   12.90 )-----------( 263      ( 10 Oct 2023 02:30 )             43.8     10-12    128<L>  |  94<L>  |  17  ----------------------------<  98  3.2<L>   |  24  |  0.58  10-11    134<L>  |  95<L>  |  24<H>  ----------------------------<  95  3.3<L>   |  25  |  0.66  10-10    133<L>  |  90<L>  |  30<H>  ----------------------------<  119<H>  4.3   |  23  |  0.76    Ca    8.9      12 Oct 2023 06:46  Ca    9.5      11 Oct 2023 06:59    TPro  7.4  /  Alb  4.0  /  TBili  1.3<H>  /  DBili  x   /  AST  26  /  ALT  26  /  AlkPhos  89  10-10    CAPILLARY BLOOD GLUCOSE              Urinalysis Basic - ( 12 Oct 2023 06:46 )    Color: x / Appearance: x / SG: x / pH: x  Gluc: 98 mg/dL / Ketone: x  / Bili: x / Urobili: x   Blood: x / Protein: x / Nitrite: x   Leuk Esterase: x / RBC: x / WBC x   Sq Epi: x / Non Sq Epi: x / Bacteria: x      Procalcitonin, Serum: 0.09 ng/mL (10-10 @ 02:30)        RECENT CULTURES:  10-10 @ 03:58 Clean Catch Clean Catch (Midstream)                <10,000 CFU/mL Normal Urogenital Sudha    rad< from: CT Abdomen and Pelvis w/ IV Cont (10.10.23 @ 04:35) >  normal.  PERITONEUM: No ascites.  VESSELS: Atherosclerotic changes.  RETROPERITONEUM/LYMPH NODES: No lymphadenopathy.  ABDOMINAL WALL: Within normal limits.  BONES: Mildly displaced leftlateral eighth rib fracture. Degenerative   changes of the spine. Mild superior endplate compression deformity at   T12, without discrete fracture line or paraspinal infiltration.    IMPRESSION:  Mildly displaced left lateral eighth rib fracture. No pneumothorax.  Trace right pleural effusion.  No evidence for acute abdominal visceral injury.  Age indeterminant superior endplate compression deformity of T12.        --- End of Report ---            JESUS MEDLEY MD; Attending Radiologist  This document has been electronically signed. Oct 10 2023  5:15AM    < end of copied text >        RESPIRATORY CULTURES:      Clostridium difficile GDH Toxins A&amp;B, EIA:   Negative (10-11 @ 22:47)      Studies  Chest X-RAY  CT SCAN Chest   Venous Dopplers: LE:   CT Abdomen  Others              
Patient is a 78y old  Female who presents with a chief complaint of Chest wall pain (11 Oct 2023 14:57)      INTERVAL HPI/OVERNIGHT EVENTS: Patient's rib fracture  is stable. She still has pain but improving. She is breathing fine, not hypoxic and not coughing. She had burst of diarrhea since this morning, elsie bad like Cdiff. Patient was on abx weeks ago so will check Cdiff to rule it out. If negative patient can be considered to be discharged today or tomorrow.     Pain Location & Control:     MEDICATIONS  (STANDING):  clonazePAM  Tablet 0.5 milliGRAM(s) Oral three times a day  dabigatran 150 milliGRAM(s) Oral every 12 hours  lidocaine   4% Patch 1 Patch Transdermal daily  lisinopril 5 milliGRAM(s) Oral daily  melatonin 3 milliGRAM(s) Oral at bedtime  naproxen 500 milliGRAM(s) Oral two times a day  potassium chloride    Tablet ER 20 milliEquivalent(s) Oral daily  risperiDONE   Tablet 1.25 milliGRAM(s) Oral at bedtime    MEDICATIONS  (PRN):  guaiFENesin Oral Liquid (Sugar-Free) 100 milliGRAM(s) Oral every 6 hours PRN Cough  loperamide 2 milliGRAM(s) Oral four times a day PRN Diarrhea  traMADol 50 milliGRAM(s) Oral every 6 hours PRN Severe Pain (7 - 10)      Allergies    No Known Allergies    Intolerances        REVIEW OF SYSTEMS:  CONSTITUTIONAL: No fever, weight loss, or fatigue  EYES: No eye pain, visual disturbances, or discharge  ENMT:  No difficulty hearing, tinnitus, vertigo; No sinus or throat pain  NECK: No pain or stiffness  BREASTS: No pain, masses, or nipple discharge  RESPIRATORY: No cough, wheezing, chills or hemoptysis; No shortness of breath  CARDIOVASCULAR: No chest pain, palpitations, dizziness, or leg swelling  GASTROINTESTINAL: No abdominal or epigastric pain. No nausea, vomiting, or hematemesis; No diarrhea or constipation. No melena or hematochezia.  GENITOURINARY: No dysuria, frequency, hematuria, or incontinence  NEUROLOGICAL: No headaches, memory loss, loss of strength, numbness, or tremors  SKIN: No itching, burning, rashes, or lesions   LYMPH NODES: No enlarged glands  ENDOCRINE: No heat or cold intolerance; No hair loss; No polydipsia or polyuria  MUSCULOSKELETAL: No back pain  PSYCHIATRIC: No depression, anxiety, mood swings, or difficulty sleeping  HEME/LYMPH: No easy bruising, or bleeding gums  ALLERGY AND IMMUNOLOGIC: No hives or eczema    Vital Signs Last 24 Hrs  T(C): 36.4 (11 Oct 2023 05:01), Max: 36.4 (10 Oct 2023 20:45)  T(F): 97.5 (11 Oct 2023 05:01), Max: 97.5 (10 Oct 2023 20:45)  HR: 70 (11 Oct 2023 05:01) (70 - 98)  BP: 102/57 (11 Oct 2023 05:01) (102/57 - 120/67)  BP(mean): --  RR: 18 (11 Oct 2023 05:01) (18 - 18)  SpO2: 94% (11 Oct 2023 05:01) (94% - 96%)    Parameters below as of 11 Oct 2023 05:01  Patient On (Oxygen Delivery Method): nasal cannula  O2 Flow (L/min): 2      PHYSICAL EXAM:  GENERAL: NAD, well-groomed, well-developed  HEAD:  Atraumatic, Normocephalic  EYES: EOMI, PERRLA, conjunctiva and sclera clear  ENMT: No tonsillar erythema, exudates, or enlargement; Moist mucous membranes, Good dentition, No lesions  NECK: Supple, No JVD, Normal thyroid  NERVOUS SYSTEM:  Alert & Oriented X 2  CHEST/LUNG: Clear to auscultation bilaterally; No rales, rhonchi, wheezing, or rubs  HEART: Regular rate and rhythm; No murmurs, rubs, or gallops  ABDOMEN: Soft, Nontender, Nondistended; Bowel sounds present  EXTREMITIES:  2+ Peripheral Pulses, No clubbing or cyanosis  LYMPH: No lymphadenopathy noted  SKIN: No rashes or lesions      LABS:    11 Oct 2023 06:59    134    |  95     |  24     ----------------------------<  95     3.3     |  25     |  0.66     Ca    9.5        11 Oct 2023 06:59      PT/INR - ( 10 Oct 2023 02:30 )   PT: 13.6 sec;   INR: 1.24 ratio         PTT - ( 10 Oct 2023 02:30 )  PTT:26.7 sec  Urinalysis Basic - ( 11 Oct 2023 06:59 )    Color: x / Appearance: x / SG: x / pH: x  Gluc: 95 mg/dL / Ketone: x  / Bili: x / Urobili: x   Blood: x / Protein: x / Nitrite: x   Leuk Esterase: x / RBC: x / WBC x   Sq Epi: x / Non Sq Epi: x / Bacteria: x      CAPILLARY BLOOD GLUCOSE        CARDIAC MARKERS ( 10 Oct 2023 02:30 )  x     / x     / 71 U/L / x     / x          Cultures  Culture Results:   <10,000 CFU/mL Normal Urogenital Sudha (10-10-23 @ 03:58)      RADIOLOGY & ADDITIONAL TESTS:    Imaging Personally Reviewed:  [X ] YES  [ ] NO    Consultant(s) Notes Reviewed:  [ X] YES  [ ] NO    Care Discussed with Consultants/Other Providers [X ] YES  [ ] NO

## 2023-10-12 NOTE — DISCHARGE NOTE NURSING/CASE MANAGEMENT/SOCIAL WORK - PATIENT PORTAL LINK FT
You can access the FollowMyHealth Patient Portal offered by [facility] by registering at the following website: [email or web address]. By joining [facility]’s FollowMyHealth portal, you will also be able to view your health information using other applications (apps) compatible with our system.

## 2023-10-12 NOTE — PROGRESS NOTE ADULT - ASSESSMENT
77 yo F with PMH of chronic Afib, respiratory failure, MDD, essential tremor, HTN, CHF, s/p AICD, GERD with recent admission in [facility] for respiratory failure, who was transferred to [facility] ER due to left chest wall pain and was found with left 8 rib Fx with mild displacement.     diarrhea- r/o Cdiff. If negative resume Imodium and cholestyramine   Left chest wall pain - due to left 8 rib Fx, continue pain control, lidocain patch, incentive spirameter, f/u with pulm, cough suppressant  hyponatremia- mild, asymptomatic on Torsemide. Monitor BMP.  UTI - resolved, treated with PO Vantin for 7 days  neck pain- x-ray which showed cervical spondylosis, no fracture or dislocation. Continue Ibuprofen PRN and Lidocaine patch PRN and Oxycodone PRN. F/u physiatrist.  hemorrhoid- hemorrhoid cream at night.PRN.   L arm edema- negative for DVT. Use compressive sleeve and arm elevation  HFrEF 48%- increase Torsemide 60 mg BID and continue Kcl.  b12 deficiency- B12 supplementary Monitor B12 in 3 months.  chronic Afib - Pradaxa  bipolar with paranoia and JANE- s/p increasing Risperdal to 0.25 at 1PM and 1 mg QHS.  S/p GDR on Zoloft and stopped.  HTN - Lisinopril  s/p AICD - interrogate Q3 months  Insomnia - Melatonin  Pain management - Tramadol PRN.  DVT ppx - on Pradaxa.     
79 yo F with PMH of chronic Afib, respiratory failure, MDD, essential tremor, HTN, CHF, s/p AICD, GERD with recent admission in [facility] for respiratory failure, who was transferred to [facility] ER due to left chest wall pain and was found with left 8 rib Fx with mild displacement.       Left sided rib fracture   A fibrillation:   MDD  BET  HTN  CHF  GERD       Left sided rib fracture   -ct chest : Mildly displaced left lateral eighth rib fracture. No pneumothorax. Trace right pleural effusion. No evidence for acute abdominal visceral injury. Age indeterminant superior endplate compression deformity of T12. Her ventilation is K: ABG noted:  pain control : NO PTX AND NO SIGN PL EFFUSION    10/11: she still has pain :  but now having diarrhea: c diff is pending    10/12: seems OK: no sob:  stable from pulm point of view:  being dced on oxygen : no ptx after rib fracture;     A fibrillation: in dabigtran   MDD : per PMD  BET : cont home meds  HTN : controlled  CHF : no overt signs of heart failure  GERD  : PPI:    jody burton     
77 yo F with PMH of chronic Afib, respiratory failure, MDD, essential tremor, HTN, CHF, s/p AICD, GERD with recent admission in [facility] for respiratory failure, who was transferred to [facility] ER due to left chest wall pain and was found with left 8 rib Fx with mild displacement.       Left sided rib fracture   A fibrillation:   MDD  BET  HTN  CHF  GERD       Left sided rib fracture   -ct chest : Mildly displaced left lateral eighth rib fracture. No pneumothorax. Trace right pleural effusion. No evidence for acute abdominal visceral injury. Age indeterminant superior endplate compression deformity of T12. Her ventilation is K: ABG noted:  pain control : NO PTX AND NO SIGN PL EFFUSION  10/11: she still has pain :  but now having diarrhea: c diff is pending  A fibrillation: in dabigtran   MDD : per PMD  BET : cont home meds  HTN : controlled  CHF : no overt signs of heart failure  GERD  : PPI:    dw acp

## 2023-10-12 NOTE — DISCHARGE NOTE NURSING/CASE MANAGEMENT/SOCIAL WORK - NSDCFUADDAPPT_GEN_ALL_CORE_FT
APPTS ARE READY TO BE MADE: [ x] YES    Best Family or Patient Contact (if needed):    Additional Information about above appointments (if needed):    1: pcp  2:   3:     Other comments or requests:       Patient is being discharged to SNF. Patient/caregiver will arrange follow up appointments.

## 2023-10-20 NOTE — ED ADULT NURSE NOTE - NSFALLRISKFACTORS_ED_ALL_ED
82 y/o female with PMHx of dementia, chronic AFIB on coumadin, s/p PPM, HTN, HLD, glaucoma, hypothyroid who presented to  with CC of weakness, diarrhea and near syncope at home. Patient admitted for ischemic colitis, UTI, septic shock and COVID.        # Septic Shock - Sources of infection UTI / COVID-19 / ischemic colitis.    - s/p tele monitoring  - CT abd/pelvis as above  - c/w IV zosyn  - C diff pcr negative , BCx negative   - UCx with streptococcus, klebsiella - both sensitive to zosyn   - BP now rising off home meds, restarted home coreg + lisinopril   - Suspect shock more related to UTI/ ischemic colitis.    - lactate 3.1 -> 1.5 /  WBC trending down /  Na improved    # B/L Moderate Pleural Effusions: CT sx consult  lasix 20 mg daily  cxr in am    # Ischemic Colitis:  - Right colon on CT  - Appreciate surgical eval-- no plans for OR for now.    - Start CLD yesterday, ADAT   - Distended on exam but no significant tenderness on exam.    - Serial abd exams.      # Coagulopathy, supra therapeutic INR (POA)  # chronic afib (on coumadin)   - Secondary to coumadin   INR very labile,   hold coumadin 10/20    # SHANA:    - Suspect all prerenal.    - Patient was on lasix at home.    - s/p 2.5 L in ER.    - NSS @ 100.    - Cr 1.78 -> 0.56    #COVID:    Patient with sx of weakness/ diarrhea but could also be explained by colitis/ UTI.    Trend.    No SOB/ cough/ fever.    Supportive care for now.    Repeat Covid test     # Diarrhea:    - C diff negative    - May be related to ischemic colitis/ COVID.      # Dementia:    Cont aricept/ namenda.    CO on floors.    Seroquel PRN.      #HTN:    Hold  Lasix/ Spironolactone with hypotension.    - BP now rising off home meds, restarted home coreg + lisinopril     #Hypothyroid:    - Cont synthroid.      #HLD:    - Cont statin.      #DVT Proph: coumadin     #Advanced directives:  as above.  DNR/ DNI.  No pressors.  No central line.       DISPO: cxr in am    POC discussed in detain with pt's daughter, Arely, team.     NOTE: Pt Sundowns in hospital/rehab. Monitor closely. Order meals. Fall Precautions. Seroquel bid.    80 y/o female with PMHx of dementia, chronic AFIB on coumadin, s/p PPM, HTN, HLD, glaucoma, hypothyroid who presented to  with CC of weakness, diarrhea and near syncope at home. Patient admitted for ischemic colitis, UTI, septic shock and COVID.        # Septic Shock - Sources of infection UTI / COVID-19 / ischemic colitis.    - s/p tele monitoring  - CT abd/pelvis as above  - c/w IV zosyn  - C diff pcr negative , BCx negative   - UCx with streptococcus, klebsiella - both sensitive to zosyn   - BP now rising off home meds, restarted home coreg + lisinopril   - Suspect shock more related to UTI/ ischemic colitis.    - lactate 3.1 -> 1.5 /  WBC trending down /  Na improved    # B/L Moderate Pleural Effusions: CT sx consult  lasix 20 mg daily  cxr in am    # Ischemic Colitis:  - Right colon on CT  - Appreciate surgical eval-- no plans for OR for now.    - Start CLD yesterday, ADAT   - Distended on exam but no significant tenderness on exam.    - Serial abd exams.      # Coagulopathy, supra therapeutic INR (POA)  # chronic afib (on coumadin)   - Secondary to coumadin   INR very labile,   hold coumadin 10/20    # SHANA:    - Suspect all prerenal.    - Patient was on lasix at home.    - s/p 2.5 L in ER.    - NSS @ 100.    - Cr 1.78 -> 0.56    #COVID:    Patient with sx of weakness/ diarrhea but could also be explained by colitis/ UTI.    Trend.    No SOB/ cough/ fever.    Supportive care for now.    Repeat Covid test     # Diarrhea:    - C diff negative    - May be related to ischemic colitis/ COVID.      # Dementia:    Cont aricept/ namenda.    CO on floors.    Seroquel PRN.      #HTN:    Hold  Lasix/ Spironolactone with hypotension.    - BP now rising off home meds, restarted home coreg + lisinopril     #Hypothyroid:    - Cont synthroid.      #HLD:    - Cont statin.      #DVT Proph: coumadin     #Advanced directives:  as above.  DNR/ DNI.  No pressors.  No central line.       DISPO: cxr in am    POC discussed in detail with p's niece and HCP , Kathy, team.        No indicators present

## 2023-10-26 ENCOUNTER — TRANSCRIPTION ENCOUNTER (OUTPATIENT)
Age: 78
End: 2023-10-26

## 2023-11-07 ENCOUNTER — TRANSCRIPTION ENCOUNTER (OUTPATIENT)
Age: 78
End: 2023-11-07

## 2023-11-13 PROCEDURE — 84145 PROCALCITONIN (PCT): CPT

## 2023-11-13 PROCEDURE — 97162 PT EVAL MOD COMPLEX 30 MIN: CPT

## 2023-11-13 PROCEDURE — 70450 CT HEAD/BRAIN W/O DYE: CPT | Mod: MA

## 2023-11-13 PROCEDURE — 85025 COMPLETE CBC W/AUTO DIFF WBC: CPT

## 2023-11-13 PROCEDURE — 96374 THER/PROPH/DIAG INJ IV PUSH: CPT

## 2023-11-13 PROCEDURE — 99285 EMERGENCY DEPT VISIT HI MDM: CPT | Mod: 25

## 2023-11-13 PROCEDURE — 74177 CT ABD & PELVIS W/CONTRAST: CPT | Mod: MA

## 2023-11-13 PROCEDURE — 72125 CT NECK SPINE W/O DYE: CPT | Mod: MA

## 2023-11-13 PROCEDURE — 72170 X-RAY EXAM OF PELVIS: CPT

## 2023-11-13 PROCEDURE — 83880 ASSAY OF NATRIURETIC PEPTIDE: CPT

## 2023-11-13 PROCEDURE — 84484 ASSAY OF TROPONIN QUANT: CPT

## 2023-11-13 PROCEDURE — 85610 PROTHROMBIN TIME: CPT

## 2023-11-13 PROCEDURE — 81001 URINALYSIS AUTO W/SCOPE: CPT

## 2023-11-13 PROCEDURE — 83690 ASSAY OF LIPASE: CPT

## 2023-11-13 PROCEDURE — 97110 THERAPEUTIC EXERCISES: CPT

## 2023-11-13 PROCEDURE — 87324 CLOSTRIDIUM AG IA: CPT

## 2023-11-13 PROCEDURE — 97530 THERAPEUTIC ACTIVITIES: CPT

## 2023-11-13 PROCEDURE — 82550 ASSAY OF CK (CPK): CPT

## 2023-11-13 PROCEDURE — 71045 X-RAY EXAM CHEST 1 VIEW: CPT

## 2023-11-13 PROCEDURE — 87086 URINE CULTURE/COLONY COUNT: CPT

## 2023-11-13 PROCEDURE — 85027 COMPLETE CBC AUTOMATED: CPT

## 2023-11-13 PROCEDURE — 71260 CT THORAX DX C+: CPT | Mod: MA

## 2023-11-13 PROCEDURE — 87449 NOS EACH ORGANISM AG IA: CPT

## 2023-11-13 PROCEDURE — 0225U NFCT DS DNA&RNA 21 SARSCOV2: CPT

## 2023-11-13 PROCEDURE — 94640 AIRWAY INHALATION TREATMENT: CPT

## 2023-11-13 PROCEDURE — 80048 BASIC METABOLIC PNL TOTAL CA: CPT

## 2023-11-13 PROCEDURE — 82803 BLOOD GASES ANY COMBINATION: CPT

## 2023-11-13 PROCEDURE — 80053 COMPREHEN METABOLIC PANEL: CPT

## 2023-11-13 PROCEDURE — 36415 COLL VENOUS BLD VENIPUNCTURE: CPT

## 2023-11-13 PROCEDURE — 85730 THROMBOPLASTIN TIME PARTIAL: CPT

## 2023-11-18 ENCOUNTER — EMERGENCY (EMERGENCY)
Facility: HOSPITAL | Age: 78
LOS: 1 days | Discharge: HOME | End: 2023-11-18
Attending: EMERGENCY MEDICINE
Payer: MEDICARE

## 2023-11-18 VITALS
SYSTOLIC BLOOD PRESSURE: 95 MMHG | HEIGHT: 66 IN | OXYGEN SATURATION: 96 % | RESPIRATION RATE: 20 BRPM | TEMPERATURE: 98 F | DIASTOLIC BLOOD PRESSURE: 52 MMHG | WEIGHT: 164.91 LBS | HEART RATE: 73 BPM

## 2023-11-18 VITALS
DIASTOLIC BLOOD PRESSURE: 42 MMHG | HEART RATE: 71 BPM | RESPIRATION RATE: 16 BRPM | OXYGEN SATURATION: 96 % | SYSTOLIC BLOOD PRESSURE: 91 MMHG

## 2023-11-18 LAB
ALBUMIN SERPL ELPH-MCNC: 3.3 G/DL — SIGNIFICANT CHANGE UP (ref 3.3–5)
ALBUMIN SERPL ELPH-MCNC: 3.3 G/DL — SIGNIFICANT CHANGE UP (ref 3.3–5)
ALP SERPL-CCNC: 94 U/L — SIGNIFICANT CHANGE UP (ref 40–120)
ALP SERPL-CCNC: 94 U/L — SIGNIFICANT CHANGE UP (ref 40–120)
ALT FLD-CCNC: 17 U/L — SIGNIFICANT CHANGE UP (ref 10–45)
ALT FLD-CCNC: 17 U/L — SIGNIFICANT CHANGE UP (ref 10–45)
ANION GAP SERPL CALC-SCNC: 9 MMOL/L — SIGNIFICANT CHANGE UP (ref 5–17)
ANION GAP SERPL CALC-SCNC: 9 MMOL/L — SIGNIFICANT CHANGE UP (ref 5–17)
APTT BLD: 57.9 SEC — HIGH (ref 24.5–35.6)
APTT BLD: 57.9 SEC — HIGH (ref 24.5–35.6)
AST SERPL-CCNC: 36 U/L — SIGNIFICANT CHANGE UP (ref 10–40)
AST SERPL-CCNC: 36 U/L — SIGNIFICANT CHANGE UP (ref 10–40)
BASOPHILS # BLD AUTO: 0.02 K/UL — SIGNIFICANT CHANGE UP (ref 0–0.2)
BASOPHILS # BLD AUTO: 0.02 K/UL — SIGNIFICANT CHANGE UP (ref 0–0.2)
BASOPHILS NFR BLD AUTO: 0.4 % — SIGNIFICANT CHANGE UP (ref 0–2)
BASOPHILS NFR BLD AUTO: 0.4 % — SIGNIFICANT CHANGE UP (ref 0–2)
BILIRUB SERPL-MCNC: 1.2 MG/DL — SIGNIFICANT CHANGE UP (ref 0.2–1.2)
BILIRUB SERPL-MCNC: 1.2 MG/DL — SIGNIFICANT CHANGE UP (ref 0.2–1.2)
BUN SERPL-MCNC: 13 MG/DL — SIGNIFICANT CHANGE UP (ref 7–23)
BUN SERPL-MCNC: 13 MG/DL — SIGNIFICANT CHANGE UP (ref 7–23)
CALCIUM SERPL-MCNC: 9.3 MG/DL — SIGNIFICANT CHANGE UP (ref 8.4–10.5)
CALCIUM SERPL-MCNC: 9.3 MG/DL — SIGNIFICANT CHANGE UP (ref 8.4–10.5)
CHLORIDE SERPL-SCNC: 100 MMOL/L — SIGNIFICANT CHANGE UP (ref 96–108)
CHLORIDE SERPL-SCNC: 100 MMOL/L — SIGNIFICANT CHANGE UP (ref 96–108)
CO2 SERPL-SCNC: 25 MMOL/L — SIGNIFICANT CHANGE UP (ref 22–31)
CO2 SERPL-SCNC: 25 MMOL/L — SIGNIFICANT CHANGE UP (ref 22–31)
CREAT SERPL-MCNC: 0.93 MG/DL — SIGNIFICANT CHANGE UP (ref 0.5–1.3)
CREAT SERPL-MCNC: 0.93 MG/DL — SIGNIFICANT CHANGE UP (ref 0.5–1.3)
EGFR: 62 ML/MIN/1.73M2 — SIGNIFICANT CHANGE UP
EGFR: 62 ML/MIN/1.73M2 — SIGNIFICANT CHANGE UP
EOSINOPHIL # BLD AUTO: 0.04 K/UL — SIGNIFICANT CHANGE UP (ref 0–0.5)
EOSINOPHIL # BLD AUTO: 0.04 K/UL — SIGNIFICANT CHANGE UP (ref 0–0.5)
EOSINOPHIL NFR BLD AUTO: 0.9 % — SIGNIFICANT CHANGE UP (ref 0–6)
EOSINOPHIL NFR BLD AUTO: 0.9 % — SIGNIFICANT CHANGE UP (ref 0–6)
GLUCOSE SERPL-MCNC: 108 MG/DL — HIGH (ref 70–99)
GLUCOSE SERPL-MCNC: 108 MG/DL — HIGH (ref 70–99)
HCT VFR BLD CALC: 35.1 % — SIGNIFICANT CHANGE UP (ref 34.5–45)
HCT VFR BLD CALC: 35.1 % — SIGNIFICANT CHANGE UP (ref 34.5–45)
HGB BLD-MCNC: 12.2 G/DL — SIGNIFICANT CHANGE UP (ref 11.5–15.5)
HGB BLD-MCNC: 12.2 G/DL — SIGNIFICANT CHANGE UP (ref 11.5–15.5)
IMM GRANULOCYTES NFR BLD AUTO: 0.4 % — SIGNIFICANT CHANGE UP (ref 0–0.9)
IMM GRANULOCYTES NFR BLD AUTO: 0.4 % — SIGNIFICANT CHANGE UP (ref 0–0.9)
INR BLD: 1.47 RATIO — HIGH (ref 0.85–1.18)
INR BLD: 1.47 RATIO — HIGH (ref 0.85–1.18)
LYMPHOCYTES # BLD AUTO: 0.75 K/UL — LOW (ref 1–3.3)
LYMPHOCYTES # BLD AUTO: 0.75 K/UL — LOW (ref 1–3.3)
LYMPHOCYTES # BLD AUTO: 16.8 % — SIGNIFICANT CHANGE UP (ref 13–44)
LYMPHOCYTES # BLD AUTO: 16.8 % — SIGNIFICANT CHANGE UP (ref 13–44)
MAGNESIUM SERPL-MCNC: 1.9 MG/DL — SIGNIFICANT CHANGE UP (ref 1.6–2.6)
MAGNESIUM SERPL-MCNC: 1.9 MG/DL — SIGNIFICANT CHANGE UP (ref 1.6–2.6)
MCHC RBC-ENTMCNC: 34.6 PG — HIGH (ref 27–34)
MCHC RBC-ENTMCNC: 34.6 PG — HIGH (ref 27–34)
MCHC RBC-ENTMCNC: 34.8 GM/DL — SIGNIFICANT CHANGE UP (ref 32–36)
MCHC RBC-ENTMCNC: 34.8 GM/DL — SIGNIFICANT CHANGE UP (ref 32–36)
MCV RBC AUTO: 99.4 FL — SIGNIFICANT CHANGE UP (ref 80–100)
MCV RBC AUTO: 99.4 FL — SIGNIFICANT CHANGE UP (ref 80–100)
MONOCYTES # BLD AUTO: 0.44 K/UL — SIGNIFICANT CHANGE UP (ref 0–0.9)
MONOCYTES # BLD AUTO: 0.44 K/UL — SIGNIFICANT CHANGE UP (ref 0–0.9)
MONOCYTES NFR BLD AUTO: 9.9 % — SIGNIFICANT CHANGE UP (ref 2–14)
MONOCYTES NFR BLD AUTO: 9.9 % — SIGNIFICANT CHANGE UP (ref 2–14)
NEUTROPHILS # BLD AUTO: 3.19 K/UL — SIGNIFICANT CHANGE UP (ref 1.8–7.4)
NEUTROPHILS # BLD AUTO: 3.19 K/UL — SIGNIFICANT CHANGE UP (ref 1.8–7.4)
NEUTROPHILS NFR BLD AUTO: 71.6 % — SIGNIFICANT CHANGE UP (ref 43–77)
NEUTROPHILS NFR BLD AUTO: 71.6 % — SIGNIFICANT CHANGE UP (ref 43–77)
NRBC # BLD: 0 /100 WBCS — SIGNIFICANT CHANGE UP (ref 0–0)
NRBC # BLD: 0 /100 WBCS — SIGNIFICANT CHANGE UP (ref 0–0)
PLATELET # BLD AUTO: 182 K/UL — SIGNIFICANT CHANGE UP (ref 150–400)
PLATELET # BLD AUTO: 182 K/UL — SIGNIFICANT CHANGE UP (ref 150–400)
POTASSIUM SERPL-MCNC: 5.2 MMOL/L — SIGNIFICANT CHANGE UP (ref 3.5–5.3)
POTASSIUM SERPL-MCNC: 5.2 MMOL/L — SIGNIFICANT CHANGE UP (ref 3.5–5.3)
POTASSIUM SERPL-SCNC: 5.2 MMOL/L — SIGNIFICANT CHANGE UP (ref 3.5–5.3)
POTASSIUM SERPL-SCNC: 5.2 MMOL/L — SIGNIFICANT CHANGE UP (ref 3.5–5.3)
PROT SERPL-MCNC: 6.4 G/DL — SIGNIFICANT CHANGE UP (ref 6–8.3)
PROT SERPL-MCNC: 6.4 G/DL — SIGNIFICANT CHANGE UP (ref 6–8.3)
PROTHROM AB SERPL-ACNC: 15.3 SEC — HIGH (ref 9.5–13)
PROTHROM AB SERPL-ACNC: 15.3 SEC — HIGH (ref 9.5–13)
RBC # BLD: 3.53 M/UL — LOW (ref 3.8–5.2)
RBC # BLD: 3.53 M/UL — LOW (ref 3.8–5.2)
RBC # FLD: 13.2 % — SIGNIFICANT CHANGE UP (ref 10.3–14.5)
RBC # FLD: 13.2 % — SIGNIFICANT CHANGE UP (ref 10.3–14.5)
SODIUM SERPL-SCNC: 134 MMOL/L — LOW (ref 135–145)
SODIUM SERPL-SCNC: 134 MMOL/L — LOW (ref 135–145)
WBC # BLD: 4.46 K/UL — SIGNIFICANT CHANGE UP (ref 3.8–10.5)
WBC # BLD: 4.46 K/UL — SIGNIFICANT CHANGE UP (ref 3.8–10.5)
WBC # FLD AUTO: 4.46 K/UL — SIGNIFICANT CHANGE UP (ref 3.8–10.5)
WBC # FLD AUTO: 4.46 K/UL — SIGNIFICANT CHANGE UP (ref 3.8–10.5)

## 2023-11-18 PROCEDURE — 80053 COMPREHEN METABOLIC PANEL: CPT

## 2023-11-18 PROCEDURE — 73701 CT LOWER EXTREMITY W/DYE: CPT | Mod: MA

## 2023-11-18 PROCEDURE — 73562 X-RAY EXAM OF KNEE 3: CPT

## 2023-11-18 PROCEDURE — 76377 3D RENDER W/INTRP POSTPROCES: CPT

## 2023-11-18 PROCEDURE — 73552 X-RAY EXAM OF FEMUR 2/>: CPT | Mod: 26,LT

## 2023-11-18 PROCEDURE — 99285 EMERGENCY DEPT VISIT HI MDM: CPT | Mod: GC

## 2023-11-18 PROCEDURE — 73562 X-RAY EXAM OF KNEE 3: CPT | Mod: 26,LT

## 2023-11-18 PROCEDURE — 73590 X-RAY EXAM OF LOWER LEG: CPT

## 2023-11-18 PROCEDURE — 73502 X-RAY EXAM HIP UNI 2-3 VIEWS: CPT | Mod: 26,LT

## 2023-11-18 PROCEDURE — 76377 3D RENDER W/INTRP POSTPROCES: CPT | Mod: 26

## 2023-11-18 PROCEDURE — 72170 X-RAY EXAM OF PELVIS: CPT

## 2023-11-18 PROCEDURE — 73590 X-RAY EXAM OF LOWER LEG: CPT | Mod: 26,LT

## 2023-11-18 PROCEDURE — 85025 COMPLETE CBC W/AUTO DIFF WBC: CPT

## 2023-11-18 PROCEDURE — 83735 ASSAY OF MAGNESIUM: CPT

## 2023-11-18 PROCEDURE — 71045 X-RAY EXAM CHEST 1 VIEW: CPT

## 2023-11-18 PROCEDURE — 86900 BLOOD TYPING SEROLOGIC ABO: CPT

## 2023-11-18 PROCEDURE — 85610 PROTHROMBIN TIME: CPT

## 2023-11-18 PROCEDURE — 85730 THROMBOPLASTIN TIME PARTIAL: CPT

## 2023-11-18 PROCEDURE — 86901 BLOOD TYPING SEROLOGIC RH(D): CPT

## 2023-11-18 PROCEDURE — 99284 EMERGENCY DEPT VISIT MOD MDM: CPT | Mod: 25

## 2023-11-18 PROCEDURE — 72170 X-RAY EXAM OF PELVIS: CPT | Mod: 26,XE

## 2023-11-18 PROCEDURE — 73552 X-RAY EXAM OF FEMUR 2/>: CPT

## 2023-11-18 PROCEDURE — 73701 CT LOWER EXTREMITY W/DYE: CPT | Mod: 26,LT,MA

## 2023-11-18 PROCEDURE — 73502 X-RAY EXAM HIP UNI 2-3 VIEWS: CPT

## 2023-11-18 PROCEDURE — 86850 RBC ANTIBODY SCREEN: CPT

## 2023-11-18 PROCEDURE — 71045 X-RAY EXAM CHEST 1 VIEW: CPT | Mod: 26

## 2023-11-18 RX ORDER — MORPHINE SULFATE 50 MG/1
4 CAPSULE, EXTENDED RELEASE ORAL ONCE
Refills: 0 | Status: COMPLETED | OUTPATIENT
Start: 2023-11-18 | End: 2023-11-18

## 2023-11-18 RX ORDER — SODIUM CHLORIDE 9 MG/ML
500 INJECTION INTRAMUSCULAR; INTRAVENOUS; SUBCUTANEOUS ONCE
Refills: 0 | Status: COMPLETED | OUTPATIENT
Start: 2023-11-18 | End: 2023-11-18

## 2023-11-18 RX ORDER — ACETAMINOPHEN 500 MG
975 TABLET ORAL ONCE
Refills: 0 | Status: COMPLETED | OUTPATIENT
Start: 2023-11-18 | End: 2023-11-18

## 2023-11-18 RX ADMIN — Medication 975 MILLIGRAM(S): at 13:14

## 2023-11-18 RX ADMIN — SODIUM CHLORIDE 500 MILLILITER(S): 9 INJECTION INTRAMUSCULAR; INTRAVENOUS; SUBCUTANEOUS at 13:48

## 2023-11-18 RX ADMIN — Medication 975 MILLIGRAM(S): at 13:45

## 2023-11-18 NOTE — ED ADULT NURSE NOTE - ED STAT RN HANDOFF DETAILS
Report given to receiving change of shift BEL YAÑEZ patient is in no acute distress. Patient vital signs stable, plan of care explained.

## 2023-11-18 NOTE — ED PROVIDER NOTE - ATTENDING CONTRIBUTION TO CARE
78-year-old female with a history of chronic A-fib, depressive disorder, GERD, bipolar disorder, chronic back pain presenting from [facility] living with left knee pain s/p fall with l knee swelling, pain, no pain to hip or lower ext.  films ordered r/o fx, no head injury.  labs for admission usually in wheel chair.

## 2023-11-18 NOTE — ED PROVIDER NOTE - PATIENT PORTAL LINK FT
You can access the FollowMyHealth Patient Portal offered by [facility] by registering at the following website: [email or web address]. By joining [facility]’s FollowMyHealth portal, you will also be able to view your health information using other applications (apps) compatible with our system.

## 2023-11-18 NOTE — ED PROVIDER NOTE - PROGRESS NOTE DETAILS
The effusion andX-ray of the left lower extremity without any acute fracture.  CT of the left knee shows small to moderate knee joint effusion which is mixed density and likely a mixture of edema and blood products.  No hemarthrosis.  Spoke with Dr. Jaffe over the phone, dispo back to the nursing home facility and he will manage her pain control and additional symptoms.  Spoke with Sister Kenyetta over the phone.  Provided update and delivered message that she will be returning to her facility later this evening.

## 2023-11-18 NOTE — ED ADULT NURSE NOTE - OBJECTIVE STATEMENT
79yo F aaox4 h/o b/o knee replacement, presents to ED rom NH via EMS, as per pt yesterday when moved from the bed to the wheelchair, twisted the L knee, and this morning L knee is swollen  and pain, on examination L leg limited ROM, +pulses, warm to touch, +sensation, denies hit the head or LOC, no weakness, dizziness, Safety and comfort measures initiated- bed placed in lowest position and side rails raised. Pt oriented to call bell system.

## 2023-11-18 NOTE — ED ADULT NURSE NOTE - DRUG PRE-SCREENING (DAST -1)
EGD over the summer 2018 with a small 2 cm hiatal hernia and esophagitis.  She did have a nonobstructing Schatzki ring as well.  Colonoscopy with one TA polyp.
Statement Selected

## 2023-11-18 NOTE — ED PROVIDER NOTE - OBJECTIVE STATEMENT
78-year-old female with a history of chronic A-fib, depressive disorder, GERD, bipolar disorder, chronic back pain presenting from [facility] with left knee pain.  Per patient facility she was sitting down when she tried to reach for something and landed on the floor with her left leg.  Patient has since had left leg pain and decreased active and passive range of motion of her left leg.  Facility called the ambulance to bring her to the emergency department for further evaluation.  They attempted oral pain control at the facility without significant improvement.  Patient otherwise has no headache, chest pain, shortness of breath, V/D/abdominal pain, dysuria, fever, chills.  NKDA.  History of bilateral knee replacements.

## 2023-11-18 NOTE — ED PROVIDER NOTE - PHYSICAL EXAMINATION
GENERAL: NAD  HEAD: normocephalic, atraumatic  HEENT: normal conjunctiva, oral mucosa moist, uvula midline, neck supple  CARDIAC: regular rate and rhythm, normal S1S2, no appreciable murmurs  PULM: speaking in full sentences, normal breath sounds, clear to ascultation bilaterally, no rales, rhonchi, wheezing  GI: abdomen nondistended, soft, nontender  :  no suprapubic tenderness  NEURO:  no focal deficits, normal speech, AOx3  MSK: no peripheral edema, no calf tenderness b/l, Edema and significant tenderness of left knee, decreased active and passive ROM of left lower extremity, mild tenderness of left hip  SKIN: well-perfused, extremities warm

## 2023-11-18 NOTE — ED PROVIDER NOTE - NSFOLLOWUPINSTRUCTIONS_ED_ALL_ED_FT
Today in the emergency department you were evaluated for your knee pain.  There is no acute fracture at this time.  Please speak with Dr. Jaffe about the best way to treat your pain.    Please follow up with your primary care physician within 1-2 weeks of discharge from the emergency department.  Please bring a copy of your results with you.  Please return to the emergency department for worsening of your symptoms.    You may take Acetaminophen over the counter as needed for pain and/or fever. Use as directed and see medication warnings.

## 2023-11-18 NOTE — ED CLERICAL - NS ED CLERK NOTE PRE-ARRIVAL INFORMATION; ADDITIONAL PRE-ARRIVAL INFORMATION
CC/Reason For referral: Pt experienced a fall on her knee this morning.  The left knee is predominantly swollen. Pt is on Predaxa, and did not that her med this am.  Provider would like to have CT scan of Left knee to R/O Joint bleed.   Preferred Consultant(if applicable): [facility]   Who admits for you (if needed): ELAN Jaffe  Do you have documents you would like to fax over? No  Would you still like to speak to an ED attending? Yes, after pt is seen

## 2023-11-18 NOTE — ED PROVIDER NOTE - CLINICAL SUMMARY MEDICAL DECISION MAKING FREE TEXT BOX
78-year-old female with a history of chronic A-fib, depressive disorder, GERD, bipolar disorder, chronic back pain presenting from [facility] assisted living with left knee pain. Concern for LLE fracture.  Pt declining bloodwork at this time.  XRs ordered, tylenol will reassess.

## 2023-11-18 NOTE — ED ADULT NURSE NOTE - NSFALLHARMRISKINTERV_ED_ALL_ED

## 2024-08-27 ENCOUNTER — INPATIENT (INPATIENT)
Facility: HOSPITAL | Age: 79
LOS: 2 days | Discharge: NURSING, REHAB OR LONG-TERM CARE | DRG: 195 | End: 2024-08-30
Attending: HOSPITALIST | Admitting: HOSPITALIST
Payer: MEDICARE

## 2024-08-27 VITALS
SYSTOLIC BLOOD PRESSURE: 112 MMHG | HEART RATE: 73 BPM | TEMPERATURE: 98 F | RESPIRATION RATE: 32 BRPM | DIASTOLIC BLOOD PRESSURE: 68 MMHG | OXYGEN SATURATION: 93 %

## 2024-08-27 DIAGNOSIS — J18.9 PNEUMONIA, UNSPECIFIED ORGANISM: ICD-10-CM

## 2024-08-27 LAB
ALBUMIN SERPL ELPH-MCNC: 3.9 G/DL — SIGNIFICANT CHANGE UP (ref 3.3–5)
ALP SERPL-CCNC: 126 U/L — HIGH (ref 40–120)
ALT FLD-CCNC: 9 U/L — LOW (ref 10–45)
ANION GAP SERPL CALC-SCNC: 11 MMOL/L — SIGNIFICANT CHANGE UP (ref 5–17)
AST SERPL-CCNC: 12 U/L — SIGNIFICANT CHANGE UP (ref 10–40)
BASOPHILS # BLD AUTO: 0.05 K/UL — SIGNIFICANT CHANGE UP (ref 0–0.2)
BASOPHILS NFR BLD AUTO: 0.7 % — SIGNIFICANT CHANGE UP (ref 0–2)
BILIRUB SERPL-MCNC: 1 MG/DL — SIGNIFICANT CHANGE UP (ref 0.2–1.2)
BUN SERPL-MCNC: 7 MG/DL — SIGNIFICANT CHANGE UP (ref 7–23)
CALCIUM SERPL-MCNC: 9.4 MG/DL — SIGNIFICANT CHANGE UP (ref 8.4–10.5)
CHLORIDE SERPL-SCNC: 103 MMOL/L — SIGNIFICANT CHANGE UP (ref 96–108)
CO2 SERPL-SCNC: 26 MMOL/L — SIGNIFICANT CHANGE UP (ref 22–31)
CREAT SERPL-MCNC: 0.73 MG/DL — SIGNIFICANT CHANGE UP (ref 0.5–1.3)
EGFR: 83 ML/MIN/1.73M2 — SIGNIFICANT CHANGE UP
EOSINOPHIL # BLD AUTO: 0.26 K/UL — SIGNIFICANT CHANGE UP (ref 0–0.5)
EOSINOPHIL NFR BLD AUTO: 3.7 % — SIGNIFICANT CHANGE UP (ref 0–6)
FLUAV AG NPH QL: SIGNIFICANT CHANGE UP
FLUBV AG NPH QL: SIGNIFICANT CHANGE UP
GAS PNL BLDV: SIGNIFICANT CHANGE UP
GLUCOSE SERPL-MCNC: 144 MG/DL — HIGH (ref 70–99)
HCT VFR BLD CALC: 39.7 % — SIGNIFICANT CHANGE UP (ref 34.5–45)
HGB BLD-MCNC: 13.1 G/DL — SIGNIFICANT CHANGE UP (ref 11.5–15.5)
IMM GRANULOCYTES NFR BLD AUTO: 0.3 % — SIGNIFICANT CHANGE UP (ref 0–0.9)
LYMPHOCYTES # BLD AUTO: 1.42 K/UL — SIGNIFICANT CHANGE UP (ref 1–3.3)
LYMPHOCYTES # BLD AUTO: 20.1 % — SIGNIFICANT CHANGE UP (ref 13–44)
MCHC RBC-ENTMCNC: 32.4 PG — SIGNIFICANT CHANGE UP (ref 27–34)
MCHC RBC-ENTMCNC: 33 GM/DL — SIGNIFICANT CHANGE UP (ref 32–36)
MCV RBC AUTO: 98.3 FL — SIGNIFICANT CHANGE UP (ref 80–100)
MONOCYTES # BLD AUTO: 0.45 K/UL — SIGNIFICANT CHANGE UP (ref 0–0.9)
MONOCYTES NFR BLD AUTO: 6.4 % — SIGNIFICANT CHANGE UP (ref 2–14)
NEUTROPHILS # BLD AUTO: 4.86 K/UL — SIGNIFICANT CHANGE UP (ref 1.8–7.4)
NEUTROPHILS NFR BLD AUTO: 68.8 % — SIGNIFICANT CHANGE UP (ref 43–77)
NRBC # BLD: 0 /100 WBCS — SIGNIFICANT CHANGE UP (ref 0–0)
NT-PROBNP SERPL-SCNC: 713 PG/ML — HIGH (ref 0–300)
PLATELET # BLD AUTO: 199 K/UL — SIGNIFICANT CHANGE UP (ref 150–400)
POTASSIUM SERPL-MCNC: 3.5 MMOL/L — SIGNIFICANT CHANGE UP (ref 3.5–5.3)
POTASSIUM SERPL-SCNC: 3.5 MMOL/L — SIGNIFICANT CHANGE UP (ref 3.5–5.3)
PROT SERPL-MCNC: 7 G/DL — SIGNIFICANT CHANGE UP (ref 6–8.3)
RBC # BLD: 4.04 M/UL — SIGNIFICANT CHANGE UP (ref 3.8–5.2)
RBC # FLD: 12.9 % — SIGNIFICANT CHANGE UP (ref 10.3–14.5)
RSV RNA NPH QL NAA+NON-PROBE: SIGNIFICANT CHANGE UP
SARS-COV-2 RNA SPEC QL NAA+PROBE: SIGNIFICANT CHANGE UP
SODIUM SERPL-SCNC: 140 MMOL/L — SIGNIFICANT CHANGE UP (ref 135–145)
TROPONIN T, HIGH SENSITIVITY RESULT: 14 NG/L — SIGNIFICANT CHANGE UP (ref 0–51)
TROPONIN T, HIGH SENSITIVITY RESULT: 14 NG/L — SIGNIFICANT CHANGE UP (ref 0–51)
WBC # BLD: 7.06 K/UL — SIGNIFICANT CHANGE UP (ref 3.8–10.5)
WBC # FLD AUTO: 7.06 K/UL — SIGNIFICANT CHANGE UP (ref 3.8–10.5)

## 2024-08-27 PROCEDURE — 71275 CT ANGIOGRAPHY CHEST: CPT | Mod: 26

## 2024-08-27 PROCEDURE — 71046 X-RAY EXAM CHEST 2 VIEWS: CPT | Mod: 26

## 2024-08-27 PROCEDURE — 99285 EMERGENCY DEPT VISIT HI MDM: CPT

## 2024-08-27 RX ORDER — ACETAMINOPHEN 325 MG/1
650 TABLET ORAL EVERY 6 HOURS
Refills: 0 | Status: DISCONTINUED | OUTPATIENT
Start: 2024-08-27 | End: 2024-08-30

## 2024-08-27 RX ORDER — TIZANIDINE 4 MG/1
2 TABLET ORAL ONCE
Refills: 0 | Status: COMPLETED | OUTPATIENT
Start: 2024-08-27 | End: 2024-08-27

## 2024-08-27 RX ORDER — BENZONATATE 100 MG
100 CAPSULE ORAL ONCE
Refills: 0 | Status: COMPLETED | OUTPATIENT
Start: 2024-08-27 | End: 2024-08-27

## 2024-08-27 RX ORDER — AZITHROMYCIN 500 MG/1
500 TABLET, FILM COATED ORAL ONCE
Refills: 0 | Status: COMPLETED | OUTPATIENT
Start: 2024-08-27 | End: 2024-08-27

## 2024-08-27 RX ADMIN — ACETAMINOPHEN 650 MILLIGRAM(S): 325 TABLET ORAL at 23:39

## 2024-08-27 RX ADMIN — AZITHROMYCIN 255 MILLIGRAM(S): 500 TABLET, FILM COATED ORAL at 05:54

## 2024-08-27 RX ADMIN — TIZANIDINE 2 MILLIGRAM(S): 4 TABLET ORAL at 18:11

## 2024-08-27 RX ADMIN — Medication 100 MILLIGRAM(S): at 05:30

## 2024-08-27 RX ADMIN — ACETAMINOPHEN 650 MILLIGRAM(S): 325 TABLET ORAL at 18:11

## 2024-08-27 RX ADMIN — Medication 100 MILLIGRAM(S): at 21:18

## 2024-08-27 RX ADMIN — Medication 3 MILLIGRAM(S): at 21:18

## 2024-08-27 NOTE — ED ADULT NURSE NOTE - OBJECTIVE STATEMENT
78 y/o female presents to the ED BIBEMS endorsing cough and dyspnea x3 weeks. A/Ox4. Ambulatory with assistive devices at baseline. PMH: chronic A-fib, depressive disorder, GERD, bipolar disorder, chronic back pain and CHF. Patient is from [facility]. As per EMS, patient has a x-ray performed at the facility which showed BL pleural effusions. Patient received 1 duoneb in route. Patient denies chest pain, fever, nausea, vomiting, HA and chills. Safety and comfort provided.

## 2024-08-27 NOTE — H&P ADULT - NSHPPHYSICALEXAM_GEN_ALL_CORE
PHYSICAL EXAM    Constitutional: NAD, well-groomed, well-developed  HEENT: PERRLA, EOMI, Normal Hearing, MMM  Neck: No LAD, No JVD  Back: Normal spine flexure, No CVA tenderness  Respiratory: wheezing    Cardiovascular: S1 and S2, RRR, no M/G/R  Gastrointestinal: BS+, soft, NT/ND  Extremities: No peripheral edema  Vascular: 2+ peripheral pulses  Neurological: A/O x 3, no focal deficits  Skin: No rashes PHYSICAL EXAM    Constitutional: NAD, well-groomed, well-developed  HEENT: PERRLA, EOMI, Normal Hearing, MMM  Neck: No LAD, No JVD  Back: Normal spine flexure, No CVA tenderness  Respiratory: wheezing and + crackles     Cardiovascular: S1 and S2, RRR, no M/G/R  Gastrointestinal: BS+, soft, NT/ND  Extremities: No peripheral edema  Vascular: 2+ peripheral pulses  Neurological: A/O x 3, no focal deficits  Skin: No rashes

## 2024-08-27 NOTE — CONSULT NOTE ADULT - SUBJECTIVE AND OBJECTIVE BOX
Date of Service, 08-27-24 @ 10:13  CHIEF COMPLAINT:Patient is a 79y old  Female who presents with a chief complaint of cough sob (27 Aug 2024 08:32)      HPI:  78-year-old female with a history of chronic A-fib, depressive disorder, GERD, bipolar disorder, chronic back pain presents ED for cough x 2 weeks.  Patient has flat affect and offers only limited history, denies any fever chills nausea vomiting denies any chest pain denies any belly pain denies any other symptoms      PAST MEDICAL & SURGICAL HISTORY:  HTN (hypertension)  Atrial fibrillation  CHF (congestive heart failure)  Osteoarthritis  Cholelithiasis  S/P cholecystectomy  S/P hysterectomy  S/P knee replacement, bilateral  Presence of cardioverter defibrillator      MEDICATIONS  (STANDING):  	predniSONE 20 mg oral tablet: 1 tab(s) orally once a day for 3 days  · 	RisperDAL 0.25 mg oral tablet: 5 tab(s) orally once a day (at bedtime) NOTE: total of 1.25mg at bedtime, pt takes 1mg tab and 0.25mg tab  · 	RisperDAL 0.25 mg oral tablet: 1 tab(s) orally once a day at 1pm.  · 	acetaminophen 325 mg oral tablet: 2 tab(s) orally every 6 hours as needed for  pain  · 	cholestyramine 4 g/8.3 g oral powder for reconstitution: 4 gram(s) orally once a day before or after breakfast  · 	clonazePAM 0.5 mg oral tablet: 1 tab(s) orally 3 times a day  · 	Klor-Con M20 oral tablet, extended release: 1 tab(s) orally once a day  · 	Vitamin B-12 Time Release 1000 mcg oral tablet, extended release: 1 tab(s) orally once a day  · 	lisinopril 5 mg oral tablet: 1 tab(s) orally once a day  · 	loperamide 2 mg oral tablet: 1 tab(s) orally 4 times a day as needed for  diarrhea  · 	Multiple Vitamins with Minerals oral tablet: 1 tab(s) orally once a day  · 	RisperDAL 0.25 mg oral tablet: 5 tab(s) orally once a day (at bedtime) NOTE: total of 1.25mg at bedtime, pt takes 1mg + .25mg tab  · 	torsemide 20 mg oral tablet: 3 tab(s) orally 2 times a day  · 	zinc oxide topical ointment: Apply topically to affected area 2 times a day for 14 days  · 	hydrocortisone 1% topical cream: Apply topically to affected area , upper back 2 times a day x 10 days  · 	Artificial Tears ophthalmic solution: 1 drop(s) in each eye once a day  · 	melatonin 3 mg oral tablet: 1 tab(s) orally once a day (at bedtime) as needed for  insomnia  · 	Pradaxa 150 mg oral capsule: 1 cap(s) orally 2 times a day  · 	Robitussin Mucus-Chest Congestion 100mg/5mL oral liquid: 10ml (200mg) every 4 hours as needed  · 	Preparation H (phenylephrine, witch hazel) 0.25%-50% topical gel: apply topical gel rectally once daily as needed      MEDICATIONS  (PRN):      FAMILY HISTORY:      SOCIAL HISTORY:    [x ] Non-smoker  [ ] Smoker  [ ] Alcohol    Allergies    No Known Allergies    Intolerances    	    REVIEW OF SYSTEMS:  CONSTITUTIONAL: No fever, weight loss, or fatigue  EYES: No eye pain, visual disturbances, or discharge  ENT:  No difficulty hearing, tinnitus, vertigo; No sinus or throat pain  NECK: No pain or stiffness  RESPIRATORY: No cough, wheezing, chills or hemoptysis; + Shortness of Breath  CARDIOVASCULAR: No chest pain, palpitations, passing out, dizziness, or leg swelling  GASTROINTESTINAL: No abdominal or epigastric pain. No nausea, vomiting, or hematemesis; No diarrhea or constipation. No melena or hematochezia.  GENITOURINARY: No dysuria, frequency, hematuria, or incontinence  NEUROLOGICAL: No headaches, memory loss, loss of strength, numbness, or tremors  SKIN: No itching, burning, rashes, or lesions   LYMPH Nodes: No enlarged glands  ENDOCRINE: No heat or cold intolerance; No hair loss  MUSCULOSKELETAL: No joint pain or swelling; No muscle, back, or extremity pain  PSYCHIATRIC: No depression, anxiety, mood swings, or difficulty sleeping  HEME/LYMPH: No easy bruising, or bleeding gums  ALLERGY AND IMMUNOLOGIC: No hives or eczema	    [x ] All others negative	  [ ] Unable to obtain    PHYSICAL EXAM:  T(C): 36.7 (08-27-24 @ 07:45), Max: 36.8 (08-27-24 @ 05:10)  HR: 70 (08-27-24 @ 07:45) (70 - 73)  BP: 112/58 (08-27-24 @ 07:45) (112/58 - 116/55)  RR: 22 (08-27-24 @ 07:45) (20 - 32)  SpO2: 95% (08-27-24 @ 07:45) (93% - 95%)  Wt(kg): --  I&O's Summary      Appearance: Normal	  HEENT:   Normal oral mucosa, PERRL, EOMI	  Lymphatic: No lymphadenopathy  Cardiovascular: Normal S1 S2, No JVD, + murmurs, No edema  Respiratory:  rhonchi  Gastrointestinal:  Soft, Non-tender, + BS	  Skin: No rashes, No ecchymoses, No cyanosis	  Neurologic: Non-focal  Extremities: Normal range of motion, No clubbing, cyanosis or edema  Vascular: Peripheral pulses palpable 2+ bilaterally    TELEMETRY: 	    ECG:  	  RADIOLOGY:  OTHER: 	  	  LABS:	 	    CARDIAC MARKERS:                          13.1   7.06  )-----------( 199      ( 27 Aug 2024 01:35 )             39.7     08-27    140  |  103  |  7   ----------------------------<  144<H>  3.5   |  26  |  0.73    Ca    9.4      27 Aug 2024 01:35    TPro  7.0  /  Alb  3.9  /  TBili  1.0  /  DBili  x   /  AST  12  /  ALT  9<L>  /  AlkPhos  126<H>  08-27    proBNP:   Lipid Profile:   HgA1c:   TSH:       PREVIOUS DIAGNOSTIC TESTING:        < from: 12 Lead ECG (04.16.15 @ 16:50) >  Diagnosis Line   SINUS BRADYCARDIA  LEFT AXIS DEVIATION  LOW VOLTAGE QRS      < from: CT Angio Chest PE Protocol w/ IV Cont (08.27.24 @ 07:16) >  No pulmonary embolism.    Consolidation and patchy opacities in the right middle lobe, lingula, and   right lower lobe compatible with pneumonia. CT chest follow-up in 3   months recommended to ensure clearing.

## 2024-08-27 NOTE — H&P ADULT - ASSESSMENT
CHF /PNA - treat with IV lasix and IV abx Note to follow  79 yo F with PMH of chronic Afib, respiratory failure, MDD, essential tremor, HTN, CHF, s/p AICD, GERD. Patient was transferred on 10/3 and admitted to [facility] due to acute respiratory failure and hypoxia. She was found with RSV, treated with IV steroid and nebulizer. Patient complained of L chest wall pain under L breast for which ordered CXR, Troponin and EKG. Patient returned on 10/6 from [facility]    CHF exacerbation- failed to PO Bumex. Treat with IV Lasix, fluid restriction and  low sodium diet. Check echocardiogram.  F/u cardiologist.   Bacterial PNA-  Continue IV Ceftriaxone and Zithromycin. F/u nasal MRSA, urine legionella and Streptococcus.   DVT ppx- Lovenox and SCD.   HTN - resolved. BP stable Discontinue Lisinopril and monitor BP off of it.  Hyperlipidemia -Atorvastatin , monitor lipid profile.  UTI/ pyuria- resolved. Treated with Bactrim x 5 days,  Weakness-completed PT/OT. Resume  feet a day with one assist.  general pain - Tylenol.  Right rib fx : cont incentive spirometer and pain control  Diarrhea : chronic , cdiff Negative , Imodium PRN  L chest wall pain- f/u CXR, Troponin and EKG.  acute respiratory failure- due to RSV bronchitis. Treated with IV Steroid and nebulizer.  hyponatremia- mild, asymptomatic on Torsemide. Monitor BMP.  neck pain- x-ray which showed cervical spondylosis, no fracture or dislocation. Continue Ibuprofen PRN and Lidocaine patch PRN and Oxycodone PRN. F/u physiatrist.  hemorrhoid- hemorrhoid cream at night.  seasonal allergies- Claritin PRN  L arm edema- negative for DVT. Use compressive sleeve and arm elevation.  HFrEF 48%- continue Torsemide, Kcl, Lisinopril. On AICD last interogation 8/2023.  b12 deficiency- B12 supplementary Monitor B12 in 3 months.  chronic Afib - Pradaxa  bipolar with paranoia and JANE- s/p increasing Risperdal to 0.25 at 1PM and 1 mg QHS. Continue Clonazepam. S/p GDR on Zoloft and stopped. Reevaluated by psych and continue Lamictal 25 mg AM and increase PM to 100 mg.  s/p AICD - interrogate Q3 months  Insomnia - Melatonin QHS  Pain management - Tramadol PRN.

## 2024-08-27 NOTE — ED ADULT NURSE REASSESSMENT NOTE - NS ED NURSE REASSESS COMMENT FT1
Report received from RN Lala. Pt A&Ox4, respirations even and unlabored on 3L nasal cannula. Pt remains on  per MD order. Pt endorsing increased cough at this time, KIMBERLY Ga made aware, awaiting further RN interventions.

## 2024-08-27 NOTE — H&P ADULT - NSHPREVIEWOFSYSTEMS_GEN_ALL_CORE
REVIEW OF SYSTEMS:  CONSTITUTIONAL: No fever, weight loss, or fatigue  EYES: No eye pain, visual disturbances, or discharge  ENMT:  No difficulty hearing, tinnitus, vertigo; No sinus or throat pain  NECK: No pain or stiffness  BREASTS: No pain, masses, or nipple discharge  RESPIRATORY: No cough, wheezing, chills or hemoptysis; No shortness of breath  CARDIOVASCULAR: No chest pain, palpitations, dizziness, or leg swelling  GASTROINTESTINAL: No abdominal or epigastric pain. No nausea, vomiting, or hematemesis; No diarrhea or constipation. No melena or hematochezia.  GENITOURINARY: No dysuria, frequency, hematuria, or incontinence  NEUROLOGICAL: No headaches, memory loss, loss of strength, numbness, or tremors  SKIN: No itching, burning, rashes, or lesions   LYMPH NODES: No enlarged glands  ENDOCRINE: No heat or cold intolerance; No hair loss; No polydipsia or polyuria  MUSCULOSKELETAL: No joint pain or swelling; No muscle, back, or extremity pain  PSYCHIATRIC: No depression, anxiety, mood swings, or difficulty sleeping  HEME/LYMPH: No easy bruising, or bleeding gums  ALLERGY AND IMMUNOLOGIC: No hives or eczema REVIEW OF SYSTEMS:  CONSTITUTIONAL: No fever, weight loss, or fatigue  EYES: No eye pain, visual disturbances, or discharge  ENMT:  No difficulty hearing, tinnitus, vertigo; No sinus or throat pain  NECK: No pain or stiffness  BREASTS: No pain, masses, or nipple discharge  RESPIRATORY:+ cough, wheezing, chills or hemoptysis; + shortness of breath  CARDIOVASCULAR: No chest pain, palpitations, dizziness, or leg swelling  GASTROINTESTINAL: No abdominal or epigastric pain. No nausea, vomiting, or hematemesis; No diarrhea or constipation. No melena or hematochezia.  GENITOURINARY: No dysuria, frequency, hematuria, or incontinence  NEUROLOGICAL: No headaches, memory loss, loss of strength, numbness, or tremors  SKIN: No itching, burning, rashes, or lesions   LYMPH NODES: No enlarged glands  ENDOCRINE: No heat or cold intolerance; No hair loss; No polydipsia or polyuria  MUSCULOSKELETAL: No joint pain or swelling; No muscle, back, or extremity pain  PSYCHIATRIC: No depression, anxiety, mood swings, or difficulty sleeping  HEME/LYMPH: No easy bruising, or bleeding gums  ALLERGY AND IMMUNOLOGIC: No hives or eczema

## 2024-08-27 NOTE — ED ADULT NURSE REASSESSMENT NOTE - NS ED NURSE REASSESS COMMENT FT1
ROSS contacted for continuous pulse oximeter ordered. ROSS tech to contact RN for pulse oximeter less than 92%. Safety and comfort provided.

## 2024-08-27 NOTE — ED ADULT NURSE NOTE - NSFALLRISKINTERV_ED_ALL_ED

## 2024-08-27 NOTE — ED PROVIDER NOTE - OBJECTIVE STATEMENT
79-year-old female with a history of chronic A-fib, depressive disorder, GERD, bipolar disorder, chronic back pain presenting from 79-year-old female with a history of chronic A-fib, depressive disorder, GERD, bipolar disorder, chronic back pain presenting from Home for abdominal pain and back pain.  States symptoms are going on for 3 weeks acutely worsening of the past few days.  Endorsing right lower quadrant abdominal pain radiating to the back.  States history of herniated disks in her lower back.  Denies any recent trauma.  States she has been taking Tylenol for her symptoms.  Denies any vomiting, diarrhea, chest pain, shortness of breath, fever, chills, urinary symptoms. Denies red flag back pain signs. 79-year-old female with a history of chronic A-fib, depressive disorder, GERD, bipolar disorder, chronic back pain presenting

## 2024-08-27 NOTE — ED PROVIDER NOTE - PHYSICAL EXAMINATION
Eddie Palacios DO (PGY3)   Physical Exam:    Gen: NAD, AOx3  Head: NCAT  HEENT: EOMI, PEERLA  Lung: CTAB, no respiratory distress, no wheezes/rhonchi/rales B/L  CV: RRR, no murmurs, rubs or gallops  Abd: soft, mild RLQ ttp, ND, no guarding, no rigidity, no rebound tenderness, no CVA tenderness   MSK: right sided lumbar paraspianl ttp   Neuro: No focal sensory or motor deficits. Sensation intact to light touch all extremities.  Skin: Warm, well perfused, no rash, no leg swelling  Psych: normal affect, calm See Attending Attestation

## 2024-08-27 NOTE — ED PROVIDER NOTE - PROGRESS NOTE DETAILS
Notified that patient is hypoxic to high 80s and placed on 3 L. Eddie Palacios DO (PGY3)  covered for pneumonia, tba to medicine

## 2024-08-27 NOTE — ED ADULT NURSE REASSESSMENT NOTE - NS ED NURSE REASSESS COMMENT FT1
Pt diaper and linen not found to be soiled at this time. Pt bladder scanned and found to have 74 mL of urine on scan. Pt placed on prima fit.

## 2024-08-27 NOTE — ED PROVIDER NOTE - ATTENDING CONTRIBUTION TO CARE
Syed Barrera DO: I have personally performed a face to face medical and diagnostic evaluation of the patient. I have discussed with and reviewed the Resident's and/or ACP's and/or Medical/PA/NP student's note and agree with the History, ROS, Physical Exam and MDM unless otherwise indicated. A brief summary of my personal evaluation and impression can be found below.      78-year-old female with a history of chronic A-fib, depressive disorder, GERD, bipolar disorder, chronic back pain presents ED for cough x 2 weeks.  Patient has flat affect and offers only limited history, denies any fever chills nausea vomiting denies any chest pain denies any belly pain denies any other symptoms.  When asked about shortness of breath patient offers limited information.    CONSTITUTIONAL: well-appearing, in NAD  SKIN: Warm dry, normal skin turgor  HEAD: NCAT  EYES: EOMI, PERRLA, no scleral icterus, conjunctiva pink  ENT: normal pharynx with no erythema or exudates  NECK: Supple; non tender. Full ROM.  CARD: RRR, no murmurs.  RESP:   Mild bilateral crackles. no resp distress  ABD: soft, non-tender, non-distended, no rebound or guarding.  MSK: Full ROM, no bony tenderness, no pedal edema, no calf tenderness  PSYCH: Cooperative, appropriate.     Patient's vital signs are stable at this time although pulse oximeter is 94% on room air.  Patient is in no respiratory distress but given Patient's history of CHF, atrial fibrillation, will evaluate for infectious etiology such as pneumonia, also considering heart failure exacerbation with patient's minor crackles although no bilateral lower extremity edema is present, no orthopnea.  With cough for 2 weeks suspect more likely viral Etiology.  With patient's limited history we will check troponin to rule out ACS although clinically not likely.  Given patient's cough and shortness of breath, medical history will likely require admission for cardiac workup to evaluate for shortness of breath of remainder of workup is unremarkable at this time.  Patient has no documented Oxygen needs in the past, but a prior history of respiratory failure is documented.  No history of trauma.  PE not likely at this time.  Probable admission, dispo pending workup in ED.

## 2024-08-27 NOTE — ED ADULT NURSE REASSESSMENT NOTE - NS ED NURSE REASSESS COMMENT FT1
Pt had a large bowel movement. Pt linen changed, pt turned and repositioned in bed with EDT and RN at the bedside. Pt tolerated well. Pt placed on purewick Patient safety maintained, bed is in lowest position, wheels locked, and side rails raised. Patient oriented to call bell, and call bell is within reach.

## 2024-08-27 NOTE — PATIENT PROFILE ADULT - FALL HARM RISK - HARM RISK INTERVENTIONS
Assistance with ambulation/Assistance OOB with selected safe patient handling equipment/Communicate Risk of Fall with Harm to all staff/Monitor for mental status changes/Monitor gait and stability/Reinforce activity limits and safety measures with patient and family/Review medications for side effects contributing to fall risk/Sit up slowly, dangle for a short time, stand at bedside before walking/Tailored Fall Risk Interventions/Use of alarms - bed, chair and/or voice tab/Visual Cue: Yellow wristband and red socks/Bed in lowest position, wheels locked, appropriate side rails in place/Call bell, personal items and telephone in reach/Instruct patient to call for assistance before getting out of bed or chair/Non-slip footwear when patient is out of bed/[place] to call system/Physically safe environment - no spills, clutter or unnecessary equipment/Purposeful Proactive Rounding/Room/bathroom lighting operational, light cord in reach

## 2024-08-27 NOTE — ED ADULT NURSE REASSESSMENT NOTE - NS ED NURSE REASSESS COMMENT FT1
Report received from CLAUDIA Ohara. Pt found in position of comfort in bed. Aox4, MAEx4, respirations even and unlabored at this time, skin warm dry and normal for race. Vital signs within normal limits. Pt remains on 3L NC, maintaining O2 of 95-96% at this time. Pt denies pain/discomfort at this time. Pt linen changed, pt turned and repositioned in bed. Pt provided with meal tray. Patient safety maintained, bed is in lowest position, wheels locked, and side rails raised. Patient oriented to call bell, and call bell is within reach.

## 2024-08-27 NOTE — H&P ADULT - HISTORY OF PRESENT ILLNESS
seen and examined note to follow  77 yo F with PMH of chronic Afib, respiratory failure, MDD, essential tremor, HTN, CHF, s/p AICD, GERD. Patient was transferred on 10/3 and admitted to [facility] due to acute respiratory failure and hypoxia. She was found with RSV, treated with IV steroid and nebulizer. Patient complained of L chest wall pain under L breast for which ordered CXR, Troponin and EKG. Patient returned to [facility] on 10/6/23  from [facility]    Patient  failed to outpatient diuretics Bumex for last 3 days . Still coughing with SOB  and labored breathing . Transferred to hospital for HF evaluation. In ER was found with CHF and PNA . Treated with IV Ceftriaxone, Zithromycin and Lasix.

## 2024-08-27 NOTE — ED PROVIDER NOTE - CLINICAL SUMMARY MEDICAL DECISION MAKING FREE TEXT BOX
79-year-old female with a history of chronic A-fib, depressive disorder, GERD, bipolar disorder, chronic back pain presenting from Home for abdominal pain and back pain.  States symptoms are going on for 3 weeks acutely worsening of the past few days.  Endorsing right lower quadrant abdominal pain radiating to the back.   Vital signs stable, afebrile, not hypoxic. Plan for basic labs, pain control, TVUS, CT abd pelvis  Differential diagnosis includes but not limited to infectious etiology vs. metabolic derangement vs. ovarian torsion vs. appendicitis see attending attestation

## 2024-08-27 NOTE — ED ADULT NURSE REASSESSMENT NOTE - NS ED NURSE REASSESS COMMENT FT1
Pt reports to RN that she is having a headache, pt requesting tylenol at this time. Pt denies visual changes, dizziness, weakness, numbness/tingling. Pt found to have another large loose bowel movement. Pt reports to RN that she typically takes imodium at her nursing home facility and is requesting imodium at this time. ACP Arlene CHAPMAN made aware of pt headache and loose bowel movements. Pt linen and hospital brief changed, pt turned and repositioned in bed with EDT and RN at the bedside. Pt tolerated well. Patient safety maintained, bed is in lowest position, wheels locked, and side rails raised. Patient oriented to call bell, and call bell is within reach.

## 2024-08-28 ENCOUNTER — RESULT REVIEW (OUTPATIENT)
Age: 79
End: 2024-08-28

## 2024-08-28 LAB
HCT VFR BLD CALC: 39.3 % — SIGNIFICANT CHANGE UP (ref 34.5–45)
HGB BLD-MCNC: 13 G/DL — SIGNIFICANT CHANGE UP (ref 11.5–15.5)
MCHC RBC-ENTMCNC: 32 PG — SIGNIFICANT CHANGE UP (ref 27–34)
MCHC RBC-ENTMCNC: 33.1 GM/DL — SIGNIFICANT CHANGE UP (ref 32–36)
MCV RBC AUTO: 96.8 FL — SIGNIFICANT CHANGE UP (ref 80–100)
NRBC # BLD: 0 /100 WBCS — SIGNIFICANT CHANGE UP (ref 0–0)
PLATELET # BLD AUTO: 178 K/UL — SIGNIFICANT CHANGE UP (ref 150–400)
RBC # BLD: 4.06 M/UL — SIGNIFICANT CHANGE UP (ref 3.8–5.2)
RBC # FLD: 12.9 % — SIGNIFICANT CHANGE UP (ref 10.3–14.5)
WBC # BLD: 5.63 K/UL — SIGNIFICANT CHANGE UP (ref 3.8–10.5)
WBC # FLD AUTO: 5.63 K/UL — SIGNIFICANT CHANGE UP (ref 3.8–10.5)

## 2024-08-28 PROCEDURE — 93306 TTE W/DOPPLER COMPLETE: CPT | Mod: 26

## 2024-08-28 RX ORDER — RISPERIDONE 0.25 MG/1
0.25 TABLET, FILM COATED ORAL DAILY
Refills: 0 | Status: DISCONTINUED | OUTPATIENT
Start: 2024-08-28 | End: 2024-08-28

## 2024-08-28 RX ORDER — CHOLESTYRAMINE 4 G/9G
4 POWDER, FOR SUSPENSION ORAL DAILY
Refills: 0 | Status: DISCONTINUED | OUTPATIENT
Start: 2024-08-28 | End: 2024-08-30

## 2024-08-28 RX ORDER — DABIGATRAN ETEXILATE MESYLATE 40 MG/1
150 PELLET ORAL EVERY 12 HOURS
Refills: 0 | Status: DISCONTINUED | OUTPATIENT
Start: 2024-08-28 | End: 2024-08-30

## 2024-08-28 RX ORDER — POVIDONE, PROPYLENE GLYCOL 6.8; 3 MG/ML; MG/ML
1 LIQUID OPHTHALMIC
Refills: 0 | Status: DISCONTINUED | OUTPATIENT
Start: 2024-08-28 | End: 2024-08-30

## 2024-08-28 RX ORDER — GUAIFENESIN 100 MG/5ML
200 LIQUID ORAL EVERY 6 HOURS
Refills: 0 | Status: DISCONTINUED | OUTPATIENT
Start: 2024-08-28 | End: 2024-08-30

## 2024-08-28 RX ORDER — LAMOTRIGINE 100 MG/1
25 TABLET, EXTENDED RELEASE ORAL DAILY
Refills: 0 | Status: DISCONTINUED | OUTPATIENT
Start: 2024-08-28 | End: 2024-08-30

## 2024-08-28 RX ORDER — LAMOTRIGINE 100 MG/1
100 TABLET, EXTENDED RELEASE ORAL AT BEDTIME
Refills: 0 | Status: DISCONTINUED | OUTPATIENT
Start: 2024-08-28 | End: 2024-08-30

## 2024-08-28 RX ORDER — PSYLLIUM HUSK 0.4 G
1 CAPSULE ORAL DAILY
Refills: 0 | Status: DISCONTINUED | OUTPATIENT
Start: 2024-08-28 | End: 2024-08-30

## 2024-08-28 RX ORDER — AZITHROMYCIN 500 MG/1
250 TABLET, FILM COATED ORAL DAILY
Refills: 0 | Status: DISCONTINUED | OUTPATIENT
Start: 2024-08-28 | End: 2024-08-28

## 2024-08-28 RX ORDER — RISPERIDONE 0.25 MG/1
1 TABLET, FILM COATED ORAL DAILY
Refills: 0 | Status: DISCONTINUED | OUTPATIENT
Start: 2024-08-28 | End: 2024-08-28

## 2024-08-28 RX ORDER — RISPERIDONE 0.25 MG/1
1 TABLET, FILM COATED ORAL AT BEDTIME
Refills: 0 | Status: DISCONTINUED | OUTPATIENT
Start: 2024-08-28 | End: 2024-08-30

## 2024-08-28 RX ORDER — RISPERIDONE 0.25 MG/1
0.25 TABLET, FILM COATED ORAL DAILY
Refills: 0 | Status: DISCONTINUED | OUTPATIENT
Start: 2024-08-29 | End: 2024-08-30

## 2024-08-28 RX ORDER — TIZANIDINE 4 MG/1
2 TABLET ORAL
Refills: 0 | Status: DISCONTINUED | OUTPATIENT
Start: 2024-08-28 | End: 2024-08-30

## 2024-08-28 RX ORDER — AZITHROMYCIN 500 MG/1
250 TABLET, FILM COATED ORAL DAILY
Refills: 0 | Status: DISCONTINUED | OUTPATIENT
Start: 2024-08-28 | End: 2024-08-29

## 2024-08-28 RX ORDER — CLONAZEPAM 1 MG
0.5 TABLET ORAL THREE TIMES A DAY
Refills: 0 | Status: DISCONTINUED | OUTPATIENT
Start: 2024-08-28 | End: 2024-08-30

## 2024-08-28 RX ADMIN — Medication 0.5 MILLIGRAM(S): at 21:27

## 2024-08-28 RX ADMIN — ACETAMINOPHEN 650 MILLIGRAM(S): 325 TABLET ORAL at 21:28

## 2024-08-28 RX ADMIN — Medication 0.5 MILLIGRAM(S): at 13:13

## 2024-08-28 RX ADMIN — GUAIFENESIN 200 MILLIGRAM(S): 100 LIQUID ORAL at 18:15

## 2024-08-28 RX ADMIN — Medication 3 MILLIGRAM(S): at 21:28

## 2024-08-28 RX ADMIN — ACETAMINOPHEN 650 MILLIGRAM(S): 325 TABLET ORAL at 05:41

## 2024-08-28 RX ADMIN — Medication 10 MILLIGRAM(S): at 21:27

## 2024-08-28 RX ADMIN — Medication 1 TABLET(S): at 13:12

## 2024-08-28 RX ADMIN — ACETAMINOPHEN 650 MILLIGRAM(S): 325 TABLET ORAL at 00:09

## 2024-08-28 RX ADMIN — POVIDONE, PROPYLENE GLYCOL 1 DROP(S): 6.8; 3 LIQUID OPHTHALMIC at 18:15

## 2024-08-28 RX ADMIN — RISPERIDONE 1 MILLIGRAM(S): 0.25 TABLET, FILM COATED ORAL at 23:21

## 2024-08-28 RX ADMIN — TIZANIDINE 2 MILLIGRAM(S): 4 TABLET ORAL at 11:09

## 2024-08-28 RX ADMIN — LAMOTRIGINE 100 MILLIGRAM(S): 100 TABLET, EXTENDED RELEASE ORAL at 21:27

## 2024-08-28 RX ADMIN — Medication 100 MILLIGRAM(S): at 05:43

## 2024-08-28 RX ADMIN — DABIGATRAN ETEXILATE MESYLATE 150 MILLIGRAM(S): 40 PELLET ORAL at 18:37

## 2024-08-28 RX ADMIN — ACETAMINOPHEN 650 MILLIGRAM(S): 325 TABLET ORAL at 21:58

## 2024-08-28 RX ADMIN — ACETAMINOPHEN 650 MILLIGRAM(S): 325 TABLET ORAL at 11:12

## 2024-08-28 RX ADMIN — TIZANIDINE 2 MILLIGRAM(S): 4 TABLET ORAL at 18:35

## 2024-08-28 RX ADMIN — ACETAMINOPHEN 650 MILLIGRAM(S): 325 TABLET ORAL at 06:11

## 2024-08-29 LAB
ANION GAP SERPL CALC-SCNC: 11 MMOL/L — SIGNIFICANT CHANGE UP (ref 5–17)
BUN SERPL-MCNC: 6 MG/DL — LOW (ref 7–23)
CALCIUM SERPL-MCNC: 9.9 MG/DL — SIGNIFICANT CHANGE UP (ref 8.4–10.5)
CHLORIDE SERPL-SCNC: 103 MMOL/L — SIGNIFICANT CHANGE UP (ref 96–108)
CO2 SERPL-SCNC: 24 MMOL/L — SIGNIFICANT CHANGE UP (ref 22–31)
CREAT SERPL-MCNC: 0.69 MG/DL — SIGNIFICANT CHANGE UP (ref 0.5–1.3)
EGFR: 87 ML/MIN/1.73M2 — SIGNIFICANT CHANGE UP
GLUCOSE SERPL-MCNC: 103 MG/DL — HIGH (ref 70–99)
HCT VFR BLD CALC: 38.1 % — SIGNIFICANT CHANGE UP (ref 34.5–45)
HGB BLD-MCNC: 12.8 G/DL — SIGNIFICANT CHANGE UP (ref 11.5–15.5)
LEGIONELLA AG UR QL: NEGATIVE — SIGNIFICANT CHANGE UP
MCHC RBC-ENTMCNC: 32.9 PG — SIGNIFICANT CHANGE UP (ref 27–34)
MCHC RBC-ENTMCNC: 33.6 GM/DL — SIGNIFICANT CHANGE UP (ref 32–36)
MCV RBC AUTO: 97.9 FL — SIGNIFICANT CHANGE UP (ref 80–100)
MRSA PCR RESULT.: SIGNIFICANT CHANGE UP
NRBC # BLD: 0 /100 WBCS — SIGNIFICANT CHANGE UP (ref 0–0)
PLATELET # BLD AUTO: 204 K/UL — SIGNIFICANT CHANGE UP (ref 150–400)
POTASSIUM SERPL-MCNC: 3.5 MMOL/L — SIGNIFICANT CHANGE UP (ref 3.5–5.3)
POTASSIUM SERPL-SCNC: 3.5 MMOL/L — SIGNIFICANT CHANGE UP (ref 3.5–5.3)
RBC # BLD: 3.89 M/UL — SIGNIFICANT CHANGE UP (ref 3.8–5.2)
RBC # FLD: 13 % — SIGNIFICANT CHANGE UP (ref 10.3–14.5)
S AUREUS DNA NOSE QL NAA+PROBE: SIGNIFICANT CHANGE UP
S PNEUM AG UR QL: NEGATIVE — SIGNIFICANT CHANGE UP
SODIUM SERPL-SCNC: 138 MMOL/L — SIGNIFICANT CHANGE UP (ref 135–145)
WBC # BLD: 4.77 K/UL — SIGNIFICANT CHANGE UP (ref 3.8–10.5)
WBC # FLD AUTO: 4.77 K/UL — SIGNIFICANT CHANGE UP (ref 3.8–10.5)

## 2024-08-29 RX ORDER — DOXYCYCLINE MONOHYDRATE 100 MG
TABLET ORAL
Refills: 0 | Status: DISCONTINUED | OUTPATIENT
Start: 2024-08-29 | End: 2024-08-30

## 2024-08-29 RX ORDER — DOXYCYCLINE MONOHYDRATE 100 MG
100 TABLET ORAL EVERY 12 HOURS
Refills: 0 | Status: DISCONTINUED | OUTPATIENT
Start: 2024-08-29 | End: 2024-08-30

## 2024-08-29 RX ORDER — DOXYCYCLINE MONOHYDRATE 100 MG
100 TABLET ORAL ONCE
Refills: 0 | Status: COMPLETED | OUTPATIENT
Start: 2024-08-29 | End: 2024-08-29

## 2024-08-29 RX ADMIN — DABIGATRAN ETEXILATE MESYLATE 150 MILLIGRAM(S): 40 PELLET ORAL at 17:32

## 2024-08-29 RX ADMIN — Medication 100 MILLIGRAM(S): at 22:23

## 2024-08-29 RX ADMIN — RISPERIDONE 0.25 MILLIGRAM(S): 0.25 TABLET, FILM COATED ORAL at 15:53

## 2024-08-29 RX ADMIN — Medication 3 MILLIGRAM(S): at 21:31

## 2024-08-29 RX ADMIN — Medication 0.5 MILLIGRAM(S): at 05:32

## 2024-08-29 RX ADMIN — Medication 100 MILLIGRAM(S): at 12:21

## 2024-08-29 RX ADMIN — Medication 1 TABLET(S): at 15:53

## 2024-08-29 RX ADMIN — CHOLESTYRAMINE 4 GRAM(S): 4 POWDER, FOR SUSPENSION ORAL at 12:21

## 2024-08-29 RX ADMIN — TIZANIDINE 2 MILLIGRAM(S): 4 TABLET ORAL at 15:52

## 2024-08-29 RX ADMIN — Medication 100 MILLIGRAM(S): at 06:39

## 2024-08-29 RX ADMIN — GUAIFENESIN 200 MILLIGRAM(S): 100 LIQUID ORAL at 21:32

## 2024-08-29 RX ADMIN — DABIGATRAN ETEXILATE MESYLATE 150 MILLIGRAM(S): 40 PELLET ORAL at 05:32

## 2024-08-29 RX ADMIN — ACETAMINOPHEN 650 MILLIGRAM(S): 325 TABLET ORAL at 22:22

## 2024-08-29 RX ADMIN — RISPERIDONE 1 MILLIGRAM(S): 0.25 TABLET, FILM COATED ORAL at 22:23

## 2024-08-29 RX ADMIN — GUAIFENESIN 200 MILLIGRAM(S): 100 LIQUID ORAL at 15:52

## 2024-08-29 RX ADMIN — Medication 10 MILLIGRAM(S): at 21:32

## 2024-08-29 RX ADMIN — LAMOTRIGINE 25 MILLIGRAM(S): 100 TABLET, EXTENDED RELEASE ORAL at 15:53

## 2024-08-29 RX ADMIN — Medication 0.5 MILLIGRAM(S): at 21:31

## 2024-08-29 RX ADMIN — LAMOTRIGINE 100 MILLIGRAM(S): 100 TABLET, EXTENDED RELEASE ORAL at 21:31

## 2024-08-29 RX ADMIN — TIZANIDINE 2 MILLIGRAM(S): 4 TABLET ORAL at 05:40

## 2024-08-29 RX ADMIN — ACETAMINOPHEN 650 MILLIGRAM(S): 325 TABLET ORAL at 23:22

## 2024-08-29 RX ADMIN — Medication 0.5 MILLIGRAM(S): at 15:53

## 2024-08-29 RX ADMIN — POVIDONE, PROPYLENE GLYCOL 1 DROP(S): 6.8; 3 LIQUID OPHTHALMIC at 05:33

## 2024-08-29 RX ADMIN — POVIDONE, PROPYLENE GLYCOL 1 DROP(S): 6.8; 3 LIQUID OPHTHALMIC at 17:32

## 2024-08-29 NOTE — DIETITIAN INITIAL EVALUATION ADULT - PERTINENT MEDS FT
MEDICATIONS  (STANDING):  artificial  tears Solution 1 Drop(s) Both EYES two times a day  atorvastatin 10 milliGRAM(s) Oral at bedtime  cefTRIAXone   IVPB 1000 milliGRAM(s) IV Intermittent every 24 hours  cholestyramine Powder (Sugar-Free) 4 Gram(s) Oral daily  clonazePAM  Tablet 0.5 milliGRAM(s) Oral three times a day  dabigatran 150 milliGRAM(s) Oral every 12 hours  doxycycline IVPB      doxycycline IVPB 100 milliGRAM(s) IV Intermittent every 12 hours  lamoTRIgine 100 milliGRAM(s) Oral at bedtime  lamoTRIgine 25 milliGRAM(s) Oral daily  multivitamin/minerals 1 Tablet(s) Oral daily  risperiDONE   Tablet 0.25 milliGRAM(s) Oral daily  risperiDONE   Tablet 1 milliGRAM(s) Oral at bedtime    MEDICATIONS  (PRN):  acetaminophen     Tablet .. 650 milliGRAM(s) Oral every 6 hours PRN Temp greater or equal to 38C (100.4F), Mild Pain (1 - 3)  guaiFENesin Oral Liquid (Sugar-Free) 200 milliGRAM(s) Oral every 6 hours PRN Cough  loperamide 2 milliGRAM(s) Oral two times a day PRN Diarrhea  melatonin 3 milliGRAM(s) Oral at bedtime PRN for insomnia

## 2024-08-29 NOTE — DIETITIAN INITIAL EVALUATION ADULT - ENERGY INTAKE
Fair (50-75%) Patient w/ overall good/fair PO intake/appetite reported, per nutrition intake flowsheet patient w/ meals documented as 51-75% consumed.

## 2024-08-29 NOTE — DIETITIAN INITIAL EVALUATION ADULT - REASON INDICATOR FOR ASSESSMENT
Consult for "pressure injury stage 2 or >"  Source: chart, patient, RN  Chart reviewed, events noted

## 2024-08-29 NOTE — DIETITIAN INITIAL EVALUATION ADULT - ADD RECOMMEND
1. continue current diet as tolerated of: DASH/TLC diet  2. encourage PO intake, protein source with each meal as tolerated  3. patient declines oral nutrition supplements offered - reports has diarrhea when she consumes them  4. if medically feasible, consider addition of vitamin C to help aid in wound healing and continue multivitamin for the same  5. monitor PO intake, weight trend, electrolytes, blood glucose levels, labs, BMs, wound healing

## 2024-08-29 NOTE — DIETITIAN INITIAL EVALUATION ADULT - REASON
Patient reports overall eating well PTA w/ no significant weight fluctuations reported, overall fair/good PO intake/appetite during admission reported, will monitor parameters

## 2024-08-29 NOTE — DIETITIAN INITIAL EVALUATION ADULT - REASON FOR ADMISSION
Pneumonia due to infectious organism    Per chart, patient is a 77 y/o female with PMH including chronic AFib, respiratory failure, MDD, essential tremor, HTN, CHF, s/p AICD, GERD. Patient presents to [facility] w/ cough x2 weeks. Identified w/ pneumoniae and started on antibiotic regimen per MD.

## 2024-08-29 NOTE — DIETITIAN INITIAL EVALUATION ADULT - PERSON TAUGHT/METHOD
adequate caloric/protein intake w/ food sources reviewed, oral nutrition supplements, food preferences, all questions were answered/verbal instruction/patient instructed

## 2024-08-29 NOTE — DIETITIAN INITIAL EVALUATION ADULT - PERTINENT LABORATORY DATA
08-29    138  |  103  |  6<L>  ----------------------------<  103<H>  3.5   |  24  |  0.69    Ca    9.9      29 Aug 2024 07:10

## 2024-08-29 NOTE — PROGRESS NOTE ADULT - ASSESSMENT
77 yo F with PMH of chronic Afib, respiratory failure, MDD, essential tremor, HTN, CHF, s/p AICD, GERD. Patient was transferred on 10/3 and admitted to [place] due to acute respiratory failure and hypoxia. She was found with RSV, treated with IV steroid and nebulizer. Patient complained of L chest wall pain under L breast for which ordered CXR, Troponin and EKG. Patient returned on 10/6 from [place]    Bacterial PNA – Zithromycin switched to  Doxycycline  due to mild QTC prolongation 493  CHF – on PO Bumex    DVT ppx- Lovenox and SCD.   HTN - resolved. BP stable Discontinue Lisinopril and monitor BP off of it.  Hyperlipidemia -Atorvastatin , monitor lipid profile.  UTI/ pyuria- resolved. Treated with Bactrim x 5 days,  Weakness-completed PT/OT. Resume  feet a day with one assist.  general pain - Tylenol.  Right rib fx : cont incentive spirometer and pain control  Diarrhea : chronic , cdiff Negative , Imodium PRN  L chest wall pain- f/u CXR, Troponin and EKG.  acute respiratory failure- due to RSV bronchitis. Treated with IV Steroid and nebulizer.  hyponatremia- mild, asymptomatic on Torsemide. Monitor BMP.  neck pain- x-ray which showed cervical spondylosis, no fracture or dislocation. Continue Ibuprofen PRN and Lidocaine patch PRN and Oxycodone PRN. F/u physiatrist.  hemorrhoid- hemorrhoid cream at night.  seasonal allergies- Claritin PRN  L arm edema- negative for DVT. Use compressive sleeve and arm elevation.  HFrEF 48%- continue Torsemide, Kcl, Lisinopril. On AICD last interogation 8/2023.  b12 deficiency- B12 supplementary Monitor B12 in 3 months.  chronic Afib - Pradaxa  bipolar with paranoia and JANE- s/p increasing Risperdal to 0.25 at 1PM and 1 mg QHS. Continue Clonazepam. S/p GDR on Zoloft and stopped. Reevaluated by psych and continue Lamictal 25 mg AM and increase PM to 100 mg.  s/p AICD - interrogate Q3 months  Insomnia - Melatonin QHS  Pain management - Tramadol PRN.
79 yo F with PMH of chronic Afib, respiratory failure, MDD, essential tremor, HTN, CHF, s/p AICD, GERD. Patient was transferred on 10/3 and admitted to [place] due to acute respiratory failure and hypoxia. She was found with RSV, treated with IV steroid and nebulizer. Patient complained of L chest wall pain under L breast for which ordered CXR, Troponin and EKG. Patient returned on 10/6 from [place]    Bacterial PNA – treat with Ceftriaxone and Zithromycin.   CHF – on PO Bumex    DVT ppx- Lovenox and SCD.   HTN - resolved. BP stable Discontinue Lisinopril and monitor BP off of it.  Hyperlipidemia -Atorvastatin , monitor lipid profile.  UTI/ pyuria- resolved. Treated with Bactrim x 5 days,  Weakness-completed PT/OT. Resume  feet a day with one assist.  general pain - Tylenol.  Right rib fx : cont incentive spirometer and pain control  Diarrhea : chronic , cdiff Negative , Imodium PRN  L chest wall pain- f/u CXR, Troponin and EKG.  acute respiratory failure- due to RSV bronchitis. Treated with IV Steroid and nebulizer.  hyponatremia- mild, asymptomatic on Torsemide. Monitor BMP.  neck pain- x-ray which showed cervical spondylosis, no fracture or dislocation. Continue Ibuprofen PRN and Lidocaine patch PRN and Oxycodone PRN. F/u physiatrist.  hemorrhoid- hemorrhoid cream at night.  seasonal allergies- Claritin PRN  L arm edema- negative for DVT. Use compressive sleeve and arm elevation.  HFrEF 48%- continue Torsemide, Kcl, Lisinopril. On AICD last interogation 8/2023.  b12 deficiency- B12 supplementary Monitor B12 in 3 months.  chronic Afib - Pradaxa  bipolar with paranoia and JANE- s/p increasing Risperdal to 0.25 at 1PM and 1 mg QHS. Continue Clonazepam. S/p GDR on Zoloft and stopped. Reevaluated by psych and continue Lamictal 25 mg AM and increase PM to 100 mg.  s/p AICD - interrogate Q3 months  Insomnia - Melatonin QHS  Pain management - Tramadol PRN.

## 2024-08-29 NOTE — DIETITIAN INITIAL EVALUATION ADULT - ORAL INTAKE PTA/DIET HISTORY
Patient reports she has ate well PTA recently. No chewing/swallowing impairment or N/V reported. Patient reports she has history of loose BMs and normally takes imodium 3 times per day. Noted current order for imodium in chart. Patient declines oral nutrition supplements when discussed as she reports having diarrhea whenever she has attempted them in the past. Unable to specify if she follows a therapeutic diet PTA.

## 2024-08-29 NOTE — DIETITIAN INITIAL EVALUATION ADULT - OTHER CALCULATIONS
Defer fluid needs to team. Calculations based on IBW + 10% and w/ consideration for stage I pressure injury

## 2024-08-29 NOTE — DIETITIAN INITIAL EVALUATION ADULT - OTHER INFO
NKFA per patient, confirmed by chart. No height/weight on file; patient reports her height to be 5'4". When asking patient's UBW, patient reporting "105" unable to specify if its pounds/kilograms. Bedscale taken at bedside today indicating 89.9kg/197.8lbs. Only 2 weights in [facility] growth chart PTA noted as 164lbs (11/18/2023), 199lbs (10/10/2023). ? accuracy of overall weight trend. Will monitor weight trend.    - Receiving antibiotics for pneumoniae per chart.  - Ordered for multivitamin.

## 2024-08-30 ENCOUNTER — TRANSCRIPTION ENCOUNTER (OUTPATIENT)
Age: 79
End: 2024-08-30

## 2024-08-30 VITALS — WEIGHT: 201.72 LBS

## 2024-08-30 PROCEDURE — 84484 ASSAY OF TROPONIN QUANT: CPT

## 2024-08-30 PROCEDURE — 84132 ASSAY OF SERUM POTASSIUM: CPT

## 2024-08-30 PROCEDURE — 99285 EMERGENCY DEPT VISIT HI MDM: CPT

## 2024-08-30 PROCEDURE — 87641 MR-STAPH DNA AMP PROBE: CPT

## 2024-08-30 PROCEDURE — 87899 AGENT NOS ASSAY W/OPTIC: CPT

## 2024-08-30 PROCEDURE — 85025 COMPLETE CBC W/AUTO DIFF WBC: CPT

## 2024-08-30 PROCEDURE — 36415 COLL VENOUS BLD VENIPUNCTURE: CPT

## 2024-08-30 PROCEDURE — 85027 COMPLETE CBC AUTOMATED: CPT

## 2024-08-30 PROCEDURE — 87640 STAPH A DNA AMP PROBE: CPT

## 2024-08-30 PROCEDURE — 80053 COMPREHEN METABOLIC PANEL: CPT

## 2024-08-30 PROCEDURE — 87637 SARSCOV2&INF A&B&RSV AMP PRB: CPT

## 2024-08-30 PROCEDURE — 82435 ASSAY OF BLOOD CHLORIDE: CPT

## 2024-08-30 PROCEDURE — 83605 ASSAY OF LACTIC ACID: CPT

## 2024-08-30 PROCEDURE — 82330 ASSAY OF CALCIUM: CPT

## 2024-08-30 PROCEDURE — 87449 NOS EACH ORGANISM AG IA: CPT

## 2024-08-30 PROCEDURE — 82803 BLOOD GASES ANY COMBINATION: CPT

## 2024-08-30 PROCEDURE — 80048 BASIC METABOLIC PNL TOTAL CA: CPT

## 2024-08-30 PROCEDURE — 84295 ASSAY OF SERUM SODIUM: CPT

## 2024-08-30 PROCEDURE — 83880 ASSAY OF NATRIURETIC PEPTIDE: CPT

## 2024-08-30 PROCEDURE — 71275 CT ANGIOGRAPHY CHEST: CPT | Mod: MC

## 2024-08-30 PROCEDURE — 71046 X-RAY EXAM CHEST 2 VIEWS: CPT

## 2024-08-30 PROCEDURE — 82947 ASSAY GLUCOSE BLOOD QUANT: CPT

## 2024-08-30 PROCEDURE — 85018 HEMOGLOBIN: CPT

## 2024-08-30 PROCEDURE — C8929: CPT

## 2024-08-30 PROCEDURE — 85014 HEMATOCRIT: CPT

## 2024-08-30 RX ORDER — DABIGATRAN ETEXILATE MESYLATE 40 MG/1
1 PELLET ORAL
Qty: 0 | Refills: 0 | DISCHARGE
Start: 2024-08-30

## 2024-08-30 RX ORDER — LAMOTRIGINE 100 MG/1
1 TABLET, EXTENDED RELEASE ORAL
Qty: 0 | Refills: 0 | DISCHARGE
Start: 2024-08-30

## 2024-08-30 RX ORDER — RISPERIDONE 0.25 MG/1
1 TABLET, FILM COATED ORAL
Qty: 0 | Refills: 0 | DISCHARGE
Start: 2024-08-30

## 2024-08-30 RX ORDER — GUAIFENESIN 100 MG/5ML
10 LIQUID ORAL
Qty: 0 | Refills: 0 | DISCHARGE
Start: 2024-08-30

## 2024-08-30 RX ORDER — CLONAZEPAM 1 MG
1 TABLET ORAL
Qty: 0 | Refills: 0 | DISCHARGE
Start: 2024-08-30

## 2024-08-30 RX ORDER — TIZANIDINE 4 MG/1
1 TABLET ORAL
Qty: 0 | Refills: 0 | DISCHARGE
Start: 2024-08-30

## 2024-08-30 RX ADMIN — TIZANIDINE 2 MILLIGRAM(S): 4 TABLET ORAL at 12:01

## 2024-08-30 RX ADMIN — POVIDONE, PROPYLENE GLYCOL 1 DROP(S): 6.8; 3 LIQUID OPHTHALMIC at 05:19

## 2024-08-30 RX ADMIN — Medication 100 MILLIGRAM(S): at 05:18

## 2024-08-30 RX ADMIN — ACETAMINOPHEN 650 MILLIGRAM(S): 325 TABLET ORAL at 05:32

## 2024-08-30 RX ADMIN — Medication 0.5 MILLIGRAM(S): at 14:07

## 2024-08-30 RX ADMIN — DABIGATRAN ETEXILATE MESYLATE 150 MILLIGRAM(S): 40 PELLET ORAL at 05:18

## 2024-08-30 RX ADMIN — Medication 0.5 MILLIGRAM(S): at 05:18

## 2024-08-30 RX ADMIN — CHOLESTYRAMINE 4 GRAM(S): 4 POWDER, FOR SUSPENSION ORAL at 08:47

## 2024-08-30 RX ADMIN — LAMOTRIGINE 25 MILLIGRAM(S): 100 TABLET, EXTENDED RELEASE ORAL at 11:59

## 2024-08-30 RX ADMIN — Medication 1 TABLET(S): at 11:59

## 2024-08-30 RX ADMIN — RISPERIDONE 0.25 MILLIGRAM(S): 0.25 TABLET, FILM COATED ORAL at 14:07

## 2024-08-30 NOTE — DISCHARGE NOTE PROVIDER - NSDCCPCAREPLAN_GEN_ALL_CORE_FT
PRINCIPAL DISCHARGE DIAGNOSIS  Diagnosis: Pneumonia  Assessment and Plan of Treatment: Admitted with cough and SOB.   Consolidation and patchy opacities in the right middle lobe, lingula, and   right lower lobe compatible with pneumonia. CT chest follow-up in 3   months recommended to ensure clearing.  Received Ceftriaxone.        PRINCIPAL DISCHARGE DIAGNOSIS  Diagnosis: Pneumonia  Assessment and Plan of Treatment: Admitted with cough and SOB.   Consolidation and patchy opacities in the right middle lobe, lingula, and   right lower lobe compatible with pneumonia. CT chest follow-up in 3   months recommended to ensure clearing.  Received Ceftriaxone.         SECONDARY DISCHARGE DIAGNOSES  Diagnosis: Chronic systolic congestive heart failure  Assessment and Plan of Treatment: Weigh yourself daily.  If you gain 3lbs in 3 days, or 5lbs in a week call your Health Care Provider.  Do not eat or drink foods containing more than 2000mg of salt (sodium) in your diet every day.  Call your Health Care Provider if you have any swelling or increased swelling in your feet, ankles, and/or stomach.  Take all of your medication as directed.  If you become dizzy call your Health Care Provider.       PRINCIPAL DISCHARGE DIAGNOSIS  Diagnosis: Pneumonia  Assessment and Plan of Treatment: Admitted with cough and SOB.   Consolidation and patchy opacities in the right middle lobe, lingula, and   right lower lobe compatible with pneumonia. CT chest follow-up in 3   months recommended to ensure clearing.  Received Ceftriaxone.   CONTINUE CEFTRIAXONE FOR 3 MORE DAYS. NEXT DOSE DUE ON 8/31 AT 5 AM.   Monitor for signs of new infection - fever, cough.         SECONDARY DISCHARGE DIAGNOSES  Diagnosis: Chronic systolic congestive heart failure  Assessment and Plan of Treatment: Weigh yourself daily.  If you gain 3lbs in 3 days, or 5lbs in a week call your Health Care Provider.  Do not eat or drink foods containing more than 2000mg of salt (sodium) in your diet every day.  Call your Health Care Provider if you have any swelling or increased swelling in your feet, ankles, and/or stomach.  Take all of your medication as directed.  If you become dizzy call your Health Care Provider.      Diagnosis: Atrial fibrillation  Assessment and Plan of Treatment: Atrial fibrillation is the most common heart rhythm problem.  The condition puts you at risk for has stroke and heart attack  It helps if you control your blood pressure, not drink more than 1-2 alcohol drinks per day, cut down on caffeine, getting treatment for over active thyroid gland, and get regular exercise  Call your doctor if you feel your heart racing or beating unusually, chest tightness or pain, lightheaded, faint, shortness of breath especially with exercise  It is important to take your heart medication as prescribed  You may be on anticoagulation which is very important to take as directed - you may need blood work to monitor drug levels  Call your Cardiologist for any bleeding or call 911 to go to the closest Emergency Room for severe bleeding.      Diagnosis: History of gastroesophageal reflux (GERD)  Assessment and Plan of Treatment:   Avoid foods and drinks that make your symptoms worse, such as: Caffeine or alcoholic drinks, Chocolate, Peppermint or mint flavorings, Garlic and onions, Spicy foods' Citrus fruits, such as oranges, sukumar, or limes, Tomato-based foods such as sauce, chili, salsa, and pizza, Fried and fatty foods.  Avoid lying down for the 3 hours prior to your bedtime or prior to taking a nap.  Eat small, frequent meals instead of large meals.   Wear loose-fitting clothing. Do not wear anything tight around your waist that causes pressure on your stomach.  Raise the head of your bed 6 to 8 inches with wood blocks to help you sleep. Extra pillows will not help.  Only take over-the-counter or prescription medicines for pain, discomfort, or fever as directed by your caregiver.   Do not take aspirin, ibuprofen, or other nonsteroidal anti-inflammatory drugs (NSAIDs).  PATIENT SHOULD SEEK IMMEDIATE MEDICAL CARE IF:  You have pain in your arms, neck, jaw, teeth, or back.   Your pain increases or changes in intensity or duration.   You develop nausea, vomiting, or sweating (diaphoresis).  You develop shortness of breath, or you faint.  Your vomit is green, yellow, black, or looks like coffee grounds or blood.  Your stool is red, bloody, or black.  These symptoms could be signs of other problems, such as heart disease, gastric bleeding, or esophageal bleeding.      Diagnosis: Bipolar I disorder  Assessment and Plan of Treatment: Take medication as prescribed.   Follow up with your medical doctor for routine  monitoring, and to establish long term treatment goals.

## 2024-08-30 NOTE — DISCHARGE NOTE NURSING/CASE MANAGEMENT/SOCIAL WORK - PATIENT PORTAL LINK FT
You can access the FollowMyHealth Patient Portal offered by [facility] by registering at the following website: [email or web address]. By joining [employer]’s FollowMyHealth portal, you will also be able to view your health information using other applications (apps) compatible with our system.

## 2024-08-30 NOTE — DISCHARGE NOTE PROVIDER - HOSPITAL COURSE
HPI:  79 yo F with PMH of chronic Afib, respiratory failure, MDD, essential tremor, HTN, CHF, s/p AICD, GERD. Patient was transferred on 10/3 and admitted to [place] due to acute respiratory failure and hypoxia. She was found with RSV, treated with IV steroid and nebulizer. Patient complained of L chest wall pain under L breast for which ordered CXR, Troponin and EKG. Patient returned to [facility] on 10/6/23  from [place]    Patient  failed to outpatient diuretics Bumex for last 3 days . Still coughing with SOB  and labored breathing . Transferred to hospital for HF evaluation. In ER was found with CHF and PNA . Treated with IV Ceftriaxone, Zithromycin and Lasix.  (27 Aug 2024 08:32)    Hospital Course:      Important Medication Changes and Reason:    Active or Pending Issues Requiring Follow-up:    Advanced Directives:   [ ] Full code  [ ] DNR  [ ] Hospice    Discharge Diagnoses:         HPI:  79 yo F with PMH of chronic Afib, respiratory failure, MDD, essential tremor, HTN, CHF, s/p AICD, GERD. Patient was transferred on 10/3 and admitted to [place] due to acute respiratory failure and hypoxia. She was found with RSV, treated with IV steroid and nebulizer. Patient complained of L chest wall pain under L breast for which ordered CXR, Troponin and EKG. Patient returned to [facility] on 10/6/23  from [place]    Patient  failed to outpatient diuretics Bumex for last 3 days . Still coughing with SOB  and labored breathing . Transferred to hospital for HF evaluation. In ER was found with CHF and PNA . Treated with IV Ceftriaxone, Zithromycin and Lasix.  (27 Aug 2024 08:32)    Hospital Course:    78-year-old female with a history of chronic A-fib, depressive disorder, GERD, bipolar disorder, chronic back pain presents ED for cough x 2 weeks.  Patient has flat affect and offers only limited history, denies any fever chills nausea vomiting denies any chest pain denies any belly pain denies any other symptoms. pt with sig cardiac and medical hx with hypoxia ?sec to pneumoniae .CTA noted NO PE , +for pneumoniae r/o aspiration started  on iv abx. echo noted with regional wall motion abnormality and EF 48%.          Important Medication Changes and Reason:    Active or Pending Issues Requiring Follow-up:    Advanced Directives:   [ ] Full code  [ ] DNR  [ ] Hospice    Discharge Diagnoses:         HPI:  77 yo F with PMH of chronic Afib, respiratory failure, MDD, essential tremor, HTN, CHF, s/p AICD, GERD. Patient was transferred on 10/3 and admitted to [place] due to acute respiratory failure and hypoxia. She was found with RSV, treated with IV steroid and nebulizer. Patient complained of L chest wall pain under L breast for which ordered CXR, Troponin and EKG. Patient returned to [facility] on 10/6/23  from [place]    Patient  failed to outpatient diuretics Bumex for last 3 days . Still coughing with SOB  and labored breathing . Transferred to hospital for HF evaluation. In ER was found with CHF and PNA . Treated with IV Ceftriaxone, Zithromycin and Lasix.  (27 Aug 2024 08:32)    Hospital Course:    78-year-old female with a history of chronic A-fib, depressive disorder, GERD, bipolar disorder, chronic back pain presents ED for cough x 2 weeks.  Patient has flat affect and offers only limited history, denies any fever chills nausea vomiting denies any chest pain denies any belly pain denies any other symptoms.CTA with out PE, but showed  Consolidation and patchy opacities in the right middle lobe, lingula, and right lower lobe compatible with pneumonia. CT chest follow-up in 3 months recommended to ensure clearing. Was on Ctx  and azithromycin. Legionella urine negative. SANTY bain. echo noted with regional wall motion abnormality and EF 48%. Cardiology is following          Important Medication Changes and Reason:    Active or Pending Issues Requiring Follow-up:    Advanced Directives:   [ ] Full code  [ ] DNR  [ ] Hospice    Discharge Diagnoses:         HPI:  77 yo F with PMH of chronic Afib, respiratory failure, MDD, essential tremor, HTN, CHF, s/p AICD, GERD. Patient was transferred on 10/3 and admitted to [place] due to acute respiratory failure and hypoxia. She was found with RSV, treated with IV steroid and nebulizer. Patient complained of L chest wall pain under L breast for which ordered CXR, Troponin and EKG. Patient returned to [facility] on 10/6/23  from [place]    Patient  failed to outpatient diuretics Bumex for last 3 days . Still coughing with SOB  and labored breathing . Transferred to hospital for HF evaluation. In ER was found with CHF and PNA . Treated with IV Ceftriaxone, Zithromycin and Lasix.  (27 Aug 2024 08:32)    Hospital Course:    78-year-old female with a history of chronic A-fib, depressive disorder, GERD, bipolar disorder, chronic back pain presents ED for cough x 2 weeks.  Patient has flat affect and offers only limited history, denies any fever chills nausea vomiting denies any chest pain denies any belly pain denies any other symptoms.CTA with out PE, but showed  Consolidation and patchy opacities in the right middle lobe, lingula, and right lower lobe compatible with pneumonia. CT chest follow-up in 3 months recommended to ensure clearing. Was on Ctx  and azithromycin. Legionella urine negative. SANTY bain. echo noted with regional wall motion abnormality and EF 48%. Cardiology is following. Cleared for discharge to rehab.      Important Medication Changes and Reason:    Active or Pending Issues Requiring Follow-up:  Cardiologty  Advanced Directives:   [ ] Full code  [ ] DNR  [ ] Hospice    Discharge Diagnoses:  CAP  CHF         HPI:  79 yo F with PMH of chronic Afib, respiratory failure, MDD, essential tremor, HTN, CHF, s/p AICD, GERD. Patient was transferred on 10/3 and admitted to [place] due to acute respiratory failure and hypoxia. She was found with RSV, treated with IV steroid and nebulizer. Patient complained of L chest wall pain under L breast for which ordered CXR, Troponin and EKG. Patient returned to [facility] on 10/6/23  from [place]    Patient  failed to outpatient diuretics Bumex for last 3 days . Still coughing with SOB  and labored breathing . Transferred to hospital for HF evaluation. In ER was found with CHF and PNA . Treated with IV Ceftriaxone, Azithromycin and Lasix.  (27 Aug 2024 08:32)    Hospital Course:    78-year-old female with a history of chronic A-fib, depressive disorder, GERD, bipolar disorder, chronic back pain presents ED for cough x 2 weeks.  Patient has flat affect and offers only limited history, denies any fever chills nausea vomiting denies any chest pain denies any belly pain denies any other symptoms.CTA with out PE, but showed  Consolidation and patchy opacities in the right middle lobe, lingula, and right lower lobe compatible with pneumonia. CT chest follow-up in 3 months recommended to ensure clearing. Was on Ctx  and azithromycin. Legionella urine negative. DC Azithromycin. echo noted with regional wall motion abnormality and EF 48%. Cardiology is following. Cleared for discharge to rehab.      Important Medication Changes and Reason:    Active or Pending Issues Requiring Follow-up:  Cardiology  Advanced Directives:   [x] Full code  [ ] DNR  [ ] Hospice    Discharge Diagnoses:  CAP  CHF

## 2024-08-30 NOTE — DISCHARGE NOTE PROVIDER - NSDCMRMEDTOKEN_GEN_ALL_CORE_FT
acetaminophen 325 mg oral tablet: 2 tab(s) orally every 6 hours as needed for  pain  Artificial Tears ophthalmic solution: 1 drop(s) in each eye once a day  atorvastatin 10 mg oral tablet: 1 tab(s) orally once a day (at bedtime)  clonazePAM 0.5 mg oral tablet: 1 tab(s) orally 3 times a day  clonazePAM 0.5 mg oral tablet: 1 tab(s) orally 3 times a day  dabigatran 150 mg oral capsule: 1 cap(s) orally every 12 hours  guaiFENesin 100 mg/5 mL oral liquid: 10 milliliter(s) orally every 6 hours As needed Cough  Klor-Con M20 oral tablet, extended release: 1 tab(s) orally once a day  lamoTRIgine 100 mg oral tablet: 1 tab(s) orally once a day (at bedtime)  lamoTRIgine 25 mg oral tablet: 1 tab(s) orally once a day  lisinopril 5 mg oral tablet: 1 tab(s) orally once a day  loperamide 2 mg oral capsule: 1 cap(s) orally 2 times a day As needed Diarrhea  loperamide 2 mg oral tablet: 1 tab(s) orally 4 times a day as needed for  diarrhea  melatonin 3 mg oral tablet: 1 tab(s) orally once a day (at bedtime) as needed for  insomnia  melatonin 3 mg oral tablet: 1 tab(s) orally once a day (at bedtime) As needed for insomnia  Multiple Vitamins with Minerals oral tablet: 1 tab(s) orally once a day  Pradaxa 150 mg oral capsule: 1 cap(s) orally 2 times a day  Preparation H (phenylephrine, witch hazel) 0.25%-50% topical gel: apply topical gel rectally once daily as needed  RisperDAL 0.25 mg oral tablet: 5 tab(s) orally once a day (at bedtime) NOTE: total of 1.25mg at bedtime, pt takes 1mg tab and 0.25mg tab  RisperDAL 0.25 mg oral tablet: 5 tab(s) orally once a day (at bedtime) NOTE: total of 1.25mg at bedtime, pt takes 1mg + .25mg tab  risperiDONE 0.25 mg oral tablet: 1 tab(s) orally once a day  risperiDONE 1 mg oral tablet: 1 tab(s) orally once a day (at bedtime)  Robitussin Mucus-Chest Congestion 100mg/5mL oral liquid: 10ml (200mg) every 4 hours as needed  torsemide 20 mg oral tablet: 3 tab(s) orally 2 times a day  Vitamin B-12 Time Release 1000 mcg oral tablet, extended release: 1 tab(s) orally once a day  zinc oxide topical ointment: Apply topically to affected area 2 times a day for 14 days   acetaminophen 325 mg oral tablet: 2 tab(s) orally every 6 hours as needed for  pain  Artificial Tears ophthalmic solution: 1 drop(s) in each eye once a day  atorvastatin 10 mg oral tablet: 1 tab(s) orally once a day (at bedtime)  cefTRIAXone 1 g injection: 1 gram(s) intravenously once a day NEXT DOSE DUE ON 8/31 AT 6 AM.  clonazePAM 0.5 mg oral tablet: 1 tab(s) orally 3 times a day  dabigatran 150 mg oral capsule: 1 cap(s) orally every 12 hours  guaiFENesin 100 mg/5 mL oral liquid: 10 milliliter(s) orally every 6 hours As needed Cough  lamoTRIgine 100 mg oral tablet: 1 tab(s) orally once a day (at bedtime)  lamoTRIgine 25 mg oral tablet: 1 tab(s) orally once a day  lisinopril 5 mg oral tablet: 1 tab(s) orally once a day  loperamide 2 mg oral capsule: 1 cap(s) orally 2 times a day As needed Diarrhea  melatonin 3 mg oral tablet: 1 tab(s) orally once a day (at bedtime) As needed for insomnia  Multiple Vitamins with Minerals oral tablet: 1 tab(s) orally once a day  Preparation H (phenylephrine, witch hazel) 0.25%-50% topical gel: apply topical gel rectally once daily as needed  risperiDONE 0.25 mg oral tablet: 1 tab(s) orally once a day  risperiDONE 1 mg oral tablet: 1 tab(s) orally once a day (at bedtime)  Robitussin Mucus-Chest Congestion 100mg/5mL oral liquid: 10ml (200mg) every 4 hours as needed  torsemide 20 mg oral tablet: 3 tab(s) orally 2 times a day  Vitamin B-12 Time Release 1000 mcg oral tablet, extended release: 1 tab(s) orally once a day  zinc oxide topical ointment: Apply topically to affected area 2 times a day for 14 days

## 2024-08-30 NOTE — PROGRESS NOTE ADULT - SUBJECTIVE AND OBJECTIVE BOX
Date of Service: 08-30-24 @ 08:57           CARDIOLOGY     PROGRESS  NOTE   ________________________________________________    CHIEF COMPLAINT:Patient is a 79y old  Female who presents with a chief complaint of cough sob (29 Aug 2024 20:28)  no complain  	  REVIEW OF SYSTEMS:  CONSTITUTIONAL: No fever, weight loss, or fatigue  EYES: No eye pain, visual disturbances, or discharge  ENT:  No difficulty hearing, tinnitus, vertigo; No sinus or throat pain  NECK: No pain or stiffness  RESPIRATORY: No cough, wheezing, chills or hemoptysis; No Shortness of Breath  CARDIOVASCULAR: No chest pain, palpitations, passing out, dizziness, or leg swelling  GASTROINTESTINAL: No abdominal or epigastric pain. No nausea, vomiting, or hematemesis; No diarrhea or constipation. No melena or hematochezia.  GENITOURINARY: No dysuria, frequency, hematuria, or incontinence  NEUROLOGICAL: No headaches, memory loss, loss of strength, numbness, or tremors  SKIN: No itching, burning, rashes, or lesions   LYMPH Nodes: No enlarged glands  ENDOCRINE: No heat or cold intolerance; No hair loss  MUSCULOSKELETAL: No joint pain or swelling; No muscle, back, or extremity pain  PSYCHIATRIC: No depression, anxiety, mood swings, or difficulty sleeping  HEME/LYMPH: No easy bruising, or bleeding gums  ALLERGY AND IMMUNOLOGIC: No hives or eczema	    [x ] All others negative	  [ ] Unable to obtain    PHYSICAL EXAM:  T(C): 36.4 (08-30-24 @ 05:05), Max: 37.1 (08-29-24 @ 19:57)  HR: 70 (08-30-24 @ 05:05) (70 - 72)  BP: 120/82 (08-30-24 @ 05:05) (112/66 - 129/72)  RR: 18 (08-30-24 @ 05:05) (18 - 18)  SpO2: 94% (08-30-24 @ 05:05) (94% - 96%)  Wt(kg): --  I&O's Summary    29 Aug 2024 07:01  -  30 Aug 2024 07:00  --------------------------------------------------------  IN: 820 mL / OUT: 0 mL / NET: 820 mL        Appearance: Normal	  HEENT:   Normal oral mucosa, PERRL, EOMI	  Lymphatic: No lymphadenopathy  Cardiovascular: Normal S1 S2, No JVD,+ murmurs, No edema  Respiratory: rhonchi  Psychiatry: A & O x 3, Mood & affect appropriate  Gastrointestinal:  Soft, Non-tender, + BS	  Skin: No rashes, No ecchymoses, No cyanosis	  Extremities: , No clubbing, cyanosis or edema  Vascular: Peripheral pulses palpable 2+ bilaterally    MEDICATIONS  (STANDING):  artificial  tears Solution 1 Drop(s) Both EYES two times a day  atorvastatin 10 milliGRAM(s) Oral at bedtime  cefTRIAXone   IVPB 1000 milliGRAM(s) IV Intermittent every 24 hours  cholestyramine Powder (Sugar-Free) 4 Gram(s) Oral daily  clonazePAM  Tablet 0.5 milliGRAM(s) Oral three times a day  dabigatran 150 milliGRAM(s) Oral every 12 hours  doxycycline IVPB      doxycycline IVPB 100 milliGRAM(s) IV Intermittent every 12 hours  lamoTRIgine 100 milliGRAM(s) Oral at bedtime  lamoTRIgine 25 milliGRAM(s) Oral daily  multivitamin/minerals 1 Tablet(s) Oral daily  risperiDONE   Tablet 0.25 milliGRAM(s) Oral daily  risperiDONE   Tablet 1 milliGRAM(s) Oral at bedtime      TELEMETRY: 	    ECG:  	  RADIOLOGY:  OTHER: 	  	  LABS:	 	    CARDIAC MARKERS:                        12.8   4.77  )-----------( 204      ( 29 Aug 2024 07:10 )             38.1     08-29    138  |  103  |  6<L>  ----------------------------<  103<H>  3.5   |  24  |  0.69    Ca    9.9      29 Aug 2024 07:10      proBNP:   Lipid Profile:   HgA1c:   TSH:     Legionella pneumophila Antigen, Urine (08.29.24 @ 02:46)    Legionella Antigen, Urine: Negative: Positive Testing method: Immunochromatographic Assay.  Presumptive detection of L. pneumophila serogroup 1 antigen in urine,  suggesting recent or current infection. Order "Culture -Legionella" as  recommended to confirm infection.  Negative Testing method: Immunochromatographic Assay.  L. pneumophila serogroup 1 antigen in urine NOT detected, suggesting NO  recent or current infection. Infection due to Legionella cannot be ruled  out: other serogroups and species may cause disease, antigen may not be  present in urine in early infection, or the level of antigens in urine  may be below the detection limit of the test. Order "Culture -Legionella"  is recommended for uncommon cases of suspected Legionella pneumonia due  to organisms other than L. pneumophila serogroup 1.     1.Technically difficult image quality.   2. Left ventricular cavity is normal in size. Left ventricular systolic function is mildly decreased with an ejection fraction of 48 % by La's method of disks.   3. Basal and mid inferior septum and basal and mid inferior wall are abnormal.   4. Normal right ventricular cavity size, with normal wall thickness, and normal right ventricular systolic function.   5. Estimated pulmonary artery systolic pressure is 26 mmHg.   6. No pericardial effusion seen.    < from: 12 Lead ECG (08.27.24 @ 02:06) >  Diagnosis Line Ventricular-paced rhythm  ABNORMAL ECG  WHEN COMPARED WITH ECG OF 16-APR-2015 16:50,  SIGNIFICANT CHANGES HAVE OCCURRED  new pacing     Culture - Urine (10.10.23 @ 03:58)    Specimen Source: Clean Catch Clean Catch (Midstream)   Culture Results:   <10,000 CFU/mL Normal Urogenital Sudha        Assessment and plan  ---------------------------  78-year-old female with a history of chronic A-fib, depressive disorder, GERD, bipolar disorder, chronic back pain presents ED for cough x 2 weeks.  Patient has flat affect and offers only limited history, denies any fever chills nausea vomiting denies any chest pain denies any belly pain denies any other symptoms  pt with sig cardiac and medical hx with hypoxia ?sec to pneumoniae   pt with hx of a.fib , tele for rate control  continue cardiac meds, AC  CTA noted NO PE , +for pneumoniae r/o aspiration start on iv abx  follow up lanraji Bocanegrab  echo noted with regional wall motion abnormality and EF 48%  dvt prophylaxis  bp meds has held sec to low bp  continue all her psych meds, ?psych eval  will adjust cardiac meds  cad , chf hx with s/p AICD, ventricular pacing  bp can not tolerate any cardiac meds  continue ac  send urine for legionella or add azithro or doxycycline legionella negative will dc Doxy  oob to chair  	        
Date of Service: 08-28-24 @ 09:44           CARDIOLOGY     PROGRESS  NOTE   ________________________________________________    CHIEF COMPLAINT:Patient is a 79y old  Female who presents with a chief complaint of cough sob (27 Aug 2024 10:11)  doing better  	  REVIEW OF SYSTEMS:  CONSTITUTIONAL: No fever, weight loss, or fatigue  EYES: No eye pain, visual disturbances, or discharge  ENT:  No difficulty hearing, tinnitus, vertigo; No sinus or throat pain  NECK: No pain or stiffness  RESPIRATORY: No cough, wheezing, chills or hemoptysis; No Shortness of Breath  CARDIOVASCULAR: No chest pain, palpitations, passing out, dizziness, or leg swelling  GASTROINTESTINAL: No abdominal or epigastric pain. No nausea, vomiting, or hematemesis; No diarrhea or constipation. No melena or hematochezia.  GENITOURINARY: No dysuria, frequency, hematuria, or incontinence  NEUROLOGICAL: No headaches, memory loss, loss of strength, numbness, or tremors  SKIN: No itching, burning, rashes, or lesions   LYMPH Nodes: No enlarged glands  ENDOCRINE: No heat or cold intolerance; No hair loss  MUSCULOSKELETAL: No joint pain or swelling; No muscle, back, or extremity pain  PSYCHIATRIC: No depression, anxiety, mood swings, or difficulty sleeping  HEME/LYMPH: No easy bruising, or bleeding gums  ALLERGY AND IMMUNOLOGIC: No hives or eczema	    [x ] All others negative	  [ ] Unable to obtain    PHYSICAL EXAM:  T(C): 36.6 (08-28-24 @ 05:02), Max: 37.3 (08-27-24 @ 17:49)  HR: 70 (08-28-24 @ 05:02) (69 - 77)  BP: 105/69 (08-28-24 @ 05:02) (105/69 - 122/61)  RR: 20 (08-28-24 @ 05:02) (18 - 20)  SpO2: 96% (08-28-24 @ 05:02) (95% - 98%)  Wt(kg): --  I&O's Summary      Appearance: Normal	  HEENT:   Normal oral mucosa, PERRL, EOMI	  Lymphatic: No lymphadenopathy  Cardiovascular: Normal S1 S2, No JVD, + murmurs, No edema  Respiratory: rhonchi  Psychiatry: A & O x 3, Mood & affect appropriate  Gastrointestinal:  Soft, Non-tender, + BS	  Skin: No rashes, No ecchymoses, No cyanosis	  Extremities:  No clubbing, cyanosis or edema  Vascular: Peripheral pulses palpable 2+ bilaterally    MEDICATIONS  (STANDING):  cefTRIAXone   IVPB 1000 milliGRAM(s) IV Intermittent every 24 hours      TELEMETRY: 	    ECG:  	  RADIOLOGY:  OTHER: 	  	  LABS:	 	    CARDIAC MARKERS:                          13.0   5.63  )-----------( 178      ( 28 Aug 2024 07:19 )             39.3     08-27    140  |  103  |  7   ----------------------------<  144<H>  3.5   |  26  |  0.73    Ca    9.4      27 Aug 2024 01:35    TPro  7.0  /  Alb  3.9  /  TBili  1.0  /  DBili  x   /  AST  12  /  ALT  9<L>  /  AlkPhos  126<H>  08-27    proBNP:   Lipid Profile:   HgA1c:   TSH:         Assessment and plan  ---------------------------  78-year-old female with a history of chronic A-fib, depressive disorder, GERD, bipolar disorder, chronic back pain presents ED for cough x 2 weeks.  Patient has flat affect and offers only limited history, denies any fever chills nausea vomiting denies any chest pain denies any belly pain denies any other symptoms  pt with sig cardiac and medical hx with hypoxia ?sec to pneumoniae   pt with hx of a.fib , tele for rate control  continue cardiac meds, AC  CTA noted NO PE , +for pneumoniae r/o aspiration start on iv abx  follow up nia Hubbard  echo  dvt prophylaxis  bp meds has held sec to low bp  continue all her psych meds, ?psych eval  will adjust cardiac meds    	        
Date of Service: 08-29-24 @ 07:46           CARDIOLOGY     PROGRESS  NOTE   ________________________________________________    CHIEF COMPLAINT:Patient is a 79y old  Female who presents with a chief complaint of cough sob (28 Aug 2024 17:47)  no complain, doing better  	  REVIEW OF SYSTEMS:  CONSTITUTIONAL: No fever, weight loss, or fatigue  EYES: No eye pain, visual disturbances, or discharge  ENT:  No difficulty hearing, tinnitus, vertigo; No sinus or throat pain  NECK: No pain or stiffness  RESPIRATORY: No cough, wheezing, chills or hemoptysis; No Shortness of Breath  CARDIOVASCULAR: No chest pain, palpitations, passing out, dizziness, or leg swelling  GASTROINTESTINAL: No abdominal or epigastric pain. No nausea, vomiting, or hematemesis; No diarrhea or constipation. No melena or hematochezia.  GENITOURINARY: No dysuria, frequency, hematuria, or incontinence  NEUROLOGICAL: No headaches, memory loss, loss of strength, numbness, or tremors  SKIN: No itching, burning, rashes, or lesions   LYMPH Nodes: No enlarged glands  ENDOCRINE: No heat or cold intolerance; No hair loss  MUSCULOSKELETAL: No joint pain or swelling; No muscle, back, or extremity pain  PSYCHIATRIC: No depression, anxiety, mood swings, or difficulty sleeping  HEME/LYMPH: No easy bruising, or bleeding gums  ALLERGY AND IMMUNOLOGIC: No hives or eczema	    [ ] All others negative	  [x ] Unable to obtain    PHYSICAL EXAM:  T(C): 36.7 (08-29-24 @ 05:00), Max: 37.1 (08-28-24 @ 20:06)  HR: 70 (08-29-24 @ 05:00) (68 - 71)  BP: 110/69 (08-29-24 @ 05:00) (110/58 - 141/85)  RR: 18 (08-29-24 @ 05:00) (18 - 20)  SpO2: 98% (08-29-24 @ 05:00) (95% - 98%)  Wt(kg): --  I&O's Summary    28 Aug 2024 07:01  -  29 Aug 2024 07:00  --------------------------------------------------------  IN: 250 mL / OUT: 300 mL / NET: -50 mL        Appearance: Normal	  HEENT:   Normal oral mucosa, PERRL, EOMI	  Lymphatic: No lymphadenopathy  Cardiovascular: Normal S1 S2, No JVD, + murmurs, No edema  Respiratory: rhonchi  Gastrointestinal:  Soft, Non-tender, + BS	  Skin: No rashes, No ecchymoses, No cyanosis	  Neurologic: Non-focal  Extremities: Normal range of motion, No clubbing, cyanosis or edema  Vascular: Peripheral pulses palpable 2+ bilaterally    MEDICATIONS  (STANDING):  artificial  tears Solution 1 Drop(s) Both EYES two times a day  atorvastatin 10 milliGRAM(s) Oral at bedtime  azithromycin   Tablet 250 milliGRAM(s) Oral daily  cefTRIAXone   IVPB 1000 milliGRAM(s) IV Intermittent every 24 hours  cholestyramine Powder (Sugar-Free) 4 Gram(s) Oral daily  clonazePAM  Tablet 0.5 milliGRAM(s) Oral three times a day  dabigatran 150 milliGRAM(s) Oral every 12 hours  lamoTRIgine 100 milliGRAM(s) Oral at bedtime  lamoTRIgine 25 milliGRAM(s) Oral daily  multivitamin/minerals 1 Tablet(s) Oral daily  risperiDONE   Tablet 0.25 milliGRAM(s) Oral daily  risperiDONE   Tablet 1 milliGRAM(s) Oral at bedtime      TELEMETRY: 	    ECG:  	  RADIOLOGY:  OTHER: 	  	  LABS:	 	    CARDIAC MARKERS:                            13.0   5.63  )-----------( 178      ( 28 Aug 2024 07:19 )             39.3           proBNP:   Lipid Profile:   HgA1c:   TSH:       < from: TTE W or WO Ultrasound Enhancing Agent (08.28.24 @ 12:29) >   1.Technically difficult image quality.   2. Left ventricular cavity is normal in size. Left ventricular systolic function is mildly decreased with an ejection fraction of 48 % by La's method of disks.   3. Basal and mid inferior septum and basal and mid inferior wall are abnormal.   4. Normal right ventricular cavity size, with normal wall thickness, and normal right ventricular systolic function.   5. Estimated pulmonary artery systolic pressure is 26 mmHg.   6. No pericardial effusion seen.      < from: CT Angio Chest PE Protocol w/ IV Cont (08.27.24 @ 07:16) >  No pulmonary embolism.    Consolidation and patchy opacities in the right middle lobe, lingula, and   right lower lobe compatible with pneumonia. CT chest follow-up in 3   months recommended to ensure clearing.      < from: 12 Lead ECG (08.27.24 @ 02:06) >  Diagnosis Line Ventricular-paced rhythm  ABNORMAL ECG  WHEN COMPARED WITH ECG OF 16-APR-2015 16:50,  SIGNIFICANT CHANGES HAVE OCCURRED  new pacing         Assessment and plan  ---------------------------  78-year-old female with a history of chronic A-fib, depressive disorder, GERD, bipolar disorder, chronic back pain presents ED for cough x 2 weeks.  Patient has flat affect and offers only limited history, denies any fever chills nausea vomiting denies any chest pain denies any belly pain denies any other symptoms  pt with sig cardiac and medical hx with hypoxia ?sec to pneumoniae   pt with hx of a.fib , tele for rate control  continue cardiac meds, AC  CTA noted NO PE , +for pneumoniae r/o aspiration start on iv abx  follow up nia Hubbard  echo noted with regional wall motion abnormality and EF 48%  dvt prophylaxis  bp meds has held sec to low bp  continue all her psych meds, ?psych eval  will adjust cardiac meds  cad , chf hx with s/p AICD, ventricular pacing  bp can not tolerate any cardiac meds  continue ac  send urine for legionella or add azithro or doxicycline      	        
Patient is a 79y old  Female who presents with a chief complaint of Pneumonia due to infectious organism    Per chart, patient is a 77 y/o female with PMH including chronic AFib, respiratory failure, MDD, essential tremor, HTN, CHF, s/p AICD, GERD. Patient presents to [facility] w/ cough x2 weeks. Identified w/ pneumoniae and started on antibiotic regimen per MD. (29 Aug 2024 14:41)      INTERVAL HPI/OVERNIGHT EVENTS: PNA improved. Nasal MRSA and strep negative. Legionella pending. IV Zithromycin switched to  Doxycycline  due to mild QT prolongation 493.  Five  dose Doxycycline  then stop. If Legionella negative  no longer need Doxycyline only Ceftriaxone.     Pain Location & Control:     MEDICATIONS  (STANDING):  artificial  tears Solution 1 Drop(s) Both EYES two times a day  atorvastatin 10 milliGRAM(s) Oral at bedtime  cefTRIAXone   IVPB 1000 milliGRAM(s) IV Intermittent every 24 hours  cholestyramine Powder (Sugar-Free) 4 Gram(s) Oral daily  clonazePAM  Tablet 0.5 milliGRAM(s) Oral three times a day  dabigatran 150 milliGRAM(s) Oral every 12 hours  doxycycline IVPB      doxycycline IVPB 100 milliGRAM(s) IV Intermittent every 12 hours  lamoTRIgine 100 milliGRAM(s) Oral at bedtime  lamoTRIgine 25 milliGRAM(s) Oral daily  multivitamin/minerals 1 Tablet(s) Oral daily  risperiDONE   Tablet 1 milliGRAM(s) Oral at bedtime  risperiDONE   Tablet 0.25 milliGRAM(s) Oral daily    MEDICATIONS  (PRN):  acetaminophen     Tablet .. 650 milliGRAM(s) Oral every 6 hours PRN Temp greater or equal to 38C (100.4F), Mild Pain (1 - 3)  guaiFENesin Oral Liquid (Sugar-Free) 200 milliGRAM(s) Oral every 6 hours PRN Cough  loperamide 2 milliGRAM(s) Oral two times a day PRN Diarrhea  melatonin 3 milliGRAM(s) Oral at bedtime PRN for insomnia      Allergies    No Known Allergies    Intolerances        REVIEW OF SYSTEMS:  CONSTITUTIONAL: No fever, weight loss, or fatigue  EYES: No eye pain, visual disturbances, or discharge  ENMT:  No difficulty hearing, tinnitus, vertigo; No sinus or throat pain  NECK: No pain or stiffness  BREASTS: No pain, masses, or nipple discharge  RESPIRATORY: No cough, wheezing, chills or hemoptysis; No shortness of breath  CARDIOVASCULAR: No chest pain, palpitations, dizziness, or leg swelling  GASTROINTESTINAL: No abdominal or epigastric pain. No nausea, vomiting, or hematemesis; No diarrhea or constipation. No melena or hematochezia.  GENITOURINARY: No dysuria, frequency, hematuria, or incontinence  NEUROLOGICAL: No headaches, memory loss, loss of strength, numbness, or tremors  SKIN: No itching, burning, rashes, or lesions   LYMPH NODES: No enlarged glands  ENDOCRINE: No heat or cold intolerance; No hair loss; No polydipsia or polyuria  MUSCULOSKELETAL: No back pain  PSYCHIATRIC: No depression, anxiety, mood swings, or difficulty sleeping  HEME/LYMPH: No easy bruising, or bleeding gums  ALLERGY AND IMMUNOLOGIC: No hives or eczema    Vital Signs Last 24 Hrs  T(C): 37.1 (29 Aug 2024 19:57), Max: 37.1 (29 Aug 2024 19:57)  T(F): 98.7 (29 Aug 2024 19:57), Max: 98.7 (29 Aug 2024 19:57)  HR: 72 (29 Aug 2024 19:57) (70 - 72)  BP: 129/72 (29 Aug 2024 19:57) (110/69 - 129/72)  BP(mean): --  RR: 18 (29 Aug 2024 19:57) (18 - 18)  SpO2: 94% (29 Aug 2024 19:57) (94% - 98%)    Parameters below as of 29 Aug 2024 19:57  Patient On (Oxygen Delivery Method): nasal cannula  O2 Flow (L/min): 3      PHYSICAL EXAM:  GENERAL: NAD, well-groomed, well-developed  HEAD:  Atraumatic, Normocephalic  EYES: EOMI, PERRLA, conjunctiva and sclera clear  ENMT: No tonsillar erythema, exudates, or enlargement; Moist mucous membranes, Good dentition, No lesions  NECK: Supple, No JVD, Normal thyroid  NERVOUS SYSTEM:  Alert & Oriented X3, Good concentration; Motor Strength 5/5 B/L upper and lower extremities; DTRs 2+ intact and symmetric  CHEST/LUNG: Clear to auscultation bilaterally; No rales, rhonchi, wheezing, or rubs  HEART: Regular rate and rhythm; No murmurs, rubs, or gallops  ABDOMEN: Soft, Nontender, Nondistended; Bowel sounds present  EXTREMITIES:  2+ Peripheral Pulses, No clubbing or cyanosis  LYMPH: No lymphadenopathy noted  SKIN: No rashes or lesions      LABS:                        12.8   4.77  )-----------( 204      ( 29 Aug 2024 07:10 )             38.1     29 Aug 2024 07:10    138    |  103    |  6      ----------------------------<  103    3.5     |  24     |  0.69     Ca    9.9        29 Aug 2024 07:10        Urinalysis Basic - ( 29 Aug 2024 07:10 )    Color: x / Appearance: x / SG: x / pH: x  Gluc: 103 mg/dL / Ketone: x  / Bili: x / Urobili: x   Blood: x / Protein: x / Nitrite: x   Leuk Esterase: x / RBC: x / WBC x   Sq Epi: x / Non Sq Epi: x / Bacteria: x      CAPILLARY BLOOD GLUCOSE            Cultures      RADIOLOGY & ADDITIONAL TESTS:    Imaging Personally Reviewed:  [X ] YES  [ ] NO    Consultant(s) Notes Reviewed:  [ X] YES  [ ] NO    Care Discussed with Consultants/Other Providers [X ] YES  [ ] NO
Patient is a 79y old  Female who presents with a chief complaint of cough sob (28 Aug 2024 09:44)      INTERVAL HPI/OVERNIGHT EVENTS: Medically improved. CHF ruled out by CT scan shows infiltration . Continue treatment with Ceftriaxone and Zithromycin . Check Legionella Strep antigen and nasal MRSA.         Pain Location & Control:     MEDICATIONS  (STANDING):  artificial  tears Solution 1 Drop(s) Both EYES two times a day  atorvastatin 10 milliGRAM(s) Oral at bedtime  azithromycin  IVPB 250 milliGRAM(s) IV Intermittent daily  cefTRIAXone   IVPB 1000 milliGRAM(s) IV Intermittent every 24 hours  cholestyramine Powder (Sugar-Free) 4 Gram(s) Oral daily  clonazePAM  Tablet 0.5 milliGRAM(s) Oral three times a day  dabigatran 150 milliGRAM(s) Oral every 12 hours  lamoTRIgine 100 milliGRAM(s) Oral at bedtime  lamoTRIgine 25 milliGRAM(s) Oral daily  multivitamin/minerals 1 Tablet(s) Oral daily  risperiDONE   Tablet 0.25 milliGRAM(s) Oral daily  risperiDONE   Tablet 1 milliGRAM(s) Oral daily    MEDICATIONS  (PRN):  acetaminophen     Tablet .. 650 milliGRAM(s) Oral every 6 hours PRN Temp greater or equal to 38C (100.4F), Mild Pain (1 - 3)  guaiFENesin Oral Liquid (Sugar-Free) 200 milliGRAM(s) Oral every 6 hours PRN Cough  loperamide 2 milliGRAM(s) Oral two times a day PRN Diarrhea  melatonin 3 milliGRAM(s) Oral at bedtime PRN for insomnia      Allergies    No Known Allergies    Intolerances        REVIEW OF SYSTEMS:  CONSTITUTIONAL: No fever, weight loss, or fatigue  EYES: No eye pain, visual disturbances, or discharge  ENMT:  No difficulty hearing, tinnitus, vertigo; No sinus or throat pain  NECK: No pain or stiffness  BREASTS: No pain, masses, or nipple discharge  RESPIRATORY: +  cough, wheezing, chills or hemoptysis; No shortness of breath  CARDIOVASCULAR: No chest pain, palpitations, dizziness, or leg swelling  GASTROINTESTINAL: No abdominal or epigastric pain. No nausea, vomiting, or hematemesis; No diarrhea or constipation. No melena or hematochezia.  GENITOURINARY: No dysuria, frequency, hematuria, or incontinence  NEUROLOGICAL: No headaches, memory loss, loss of strength, numbness, or tremors  SKIN: No itching, burning, rashes, or lesions   LYMPH NODES: No enlarged glands  ENDOCRINE: No heat or cold intolerance; No hair loss; No polydipsia or polyuria  MUSCULOSKELETAL: No back pain  PSYCHIATRIC: No depression, anxiety, mood swings, or difficulty sleeping  HEME/LYMPH: No easy bruising, or bleeding gums  ALLERGY AND IMMUNOLOGIC: No hives or eczema    Vital Signs Last 24 Hrs  T(C): 36.9 (28 Aug 2024 16:09), Max: 37.3 (27 Aug 2024 17:49)  T(F): 98.5 (28 Aug 2024 16:09), Max: 99.1 (27 Aug 2024 17:49)  HR: 68 (28 Aug 2024 16:09) (68 - 77)  BP: 139/82 (28 Aug 2024 16:09) (105/69 - 139/82)  BP(mean): --  RR: 19 (28 Aug 2024 16:09) (18 - 20)  SpO2: 96% (28 Aug 2024 16:09) (95% - 98%)    Parameters below as of 28 Aug 2024 16:09  Patient On (Oxygen Delivery Method): nasal cannula  O2 Flow (L/min): 3      PHYSICAL EXAM:  GENERAL: NAD, well-groomed, well-developed  HEAD:  Atraumatic, Normocephalic  EYES: EOMI, PERRLA, conjunctiva and sclera clear  ENMT: No tonsillar erythema, exudates, or enlargement; Moist mucous membranes, Good dentition, No lesions  NECK: Supple, No JVD, Normal thyroid  NERVOUS SYSTEM:  Alert & Oriented X 2   CHEST/LUNG: Wheezing improved   HEART: Regular rate and rhythm; No murmurs, rubs, or gallops  ABDOMEN: Soft, Nontender, Nondistended; Bowel sounds present  EXTREMITIES:  2+ Peripheral Pulses, No clubbing or cyanosis  LYMPH: No lymphadenopathy noted  SKIN: No rashes or lesions      LABS:                        13.0   5.63  )-----------( 178      ( 28 Aug 2024 07:19 )             39.3       Ca    9.4        27 Aug 2024 01:35        Urinalysis Basic - ( 27 Aug 2024 01:35 )    Color: x / Appearance: x / SG: x / pH: x  Gluc: 144 mg/dL / Ketone: x  / Bili: x / Urobili: x   Blood: x / Protein: x / Nitrite: x   Leuk Esterase: x / RBC: x / WBC x   Sq Epi: x / Non Sq Epi: x / Bacteria: x      CAPILLARY BLOOD GLUCOSE            Cultures      RADIOLOGY & ADDITIONAL TESTS:    Imaging Personally Reviewed:  [ ] YES  [ ] NO    Consultant(s) Notes Reviewed:  [ ] YES  [ ] NO    Care Discussed with Consultants/Other Providers [ ] YES  [ ] NO

## 2024-08-30 NOTE — DISCHARGE NOTE PROVIDER - CARE PROVIDER_API CALL
Nickolas Jaffe  Internal Medicine  [address] [place] [id number]  Phone: [phone]  Fax: [phone]  Follow Up Time:

## 2024-08-30 NOTE — DISCHARGE NOTE PROVIDER - NSDCFUADDAPPT_GEN_ALL_CORE_FT
APPTS ARE READY TO BE MADE: [x ] YES    Best Family or Patient Contact (if needed):    Additional Information about above appointments (if needed):    1:   2:   3:     Other comments or requests:    APPTS ARE READY TO BE MADE: [x ] YES    Best Family or Patient Contact (if needed):    Additional Information about above appointments (if needed):    1:   2:   3:     Other comments or requests:   Patient is being discharged to rehab. Patient/caregiver will arrange follow up appointments.

## 2024-09-10 ENCOUNTER — TRANSCRIPTION ENCOUNTER (OUTPATIENT)
Age: 79
End: 2024-09-10

## 2024-09-24 ENCOUNTER — TRANSCRIPTION ENCOUNTER (OUTPATIENT)
Age: 79
End: 2024-09-24

## 2025-01-28 ENCOUNTER — APPOINTMENT (OUTPATIENT)
Dept: OTOLARYNGOLOGY | Facility: CLINIC | Age: 80
End: 2025-01-28

## 2025-03-25 ENCOUNTER — APPOINTMENT (OUTPATIENT)
Dept: OTOLARYNGOLOGY | Facility: CLINIC | Age: 80
End: 2025-03-25
Payer: MEDICARE

## 2025-03-25 DIAGNOSIS — H93.293 OTHER ABNORMAL AUDITORY PERCEPTIONS, BILATERAL: ICD-10-CM

## 2025-03-25 DIAGNOSIS — H61.23 IMPACTED CERUMEN, BILATERAL: ICD-10-CM

## 2025-03-25 PROCEDURE — 92567 TYMPANOMETRY: CPT

## 2025-03-25 PROCEDURE — 92557 COMPREHENSIVE HEARING TEST: CPT

## 2025-03-25 PROCEDURE — 99203 OFFICE O/P NEW LOW 30 MIN: CPT | Mod: 25

## 2025-04-15 ENCOUNTER — NON-APPOINTMENT (OUTPATIENT)
Age: 80
End: 2025-04-15

## 2025-05-01 NOTE — CONSULT NOTE ADULT - ASSESSMENT
[Respiratory Effort] : normal respiratory effort 78-year-old female with a history of chronic A-fib, depressive disorder, GERD, bipolar disorder, chronic back pain presents ED for cough x 2 weeks.  Patient has flat affect and offers only limited history, denies any fever chills nausea vomiting denies any chest pain denies any belly pain denies any other symptoms  pt with sig cardiac and medical hx with hypoxia ?sec to pneumoniae   pt with hx of a.fib , tele for rate control  continue cardiac meds, AC  CTA noted NO PE , +for pneumoniae r/o aspiration start on iv abx  follow up nia Hubbard  echo  dvt prophylaxis  continue all bp meds   [Normal Rate and Rhythm] : normal rate and rhythm [Abdomen Tenderness] : ~T ~M No abdominal tenderness [Alert] : alert [Oriented to Person] : oriented to person [Oriented to Place] : oriented to place [Oriented to Time] : oriented to time [Calm] : calm [de-identified] : no acute distress [FreeTextEntry1] : non palpable pedal pulses, L distal hallux gangrene
